# Patient Record
Sex: MALE | Race: OTHER | HISPANIC OR LATINO
[De-identification: names, ages, dates, MRNs, and addresses within clinical notes are randomized per-mention and may not be internally consistent; named-entity substitution may affect disease eponyms.]

---

## 2017-03-07 ENCOUNTER — APPOINTMENT (OUTPATIENT)
Dept: RHEUMATOLOGY | Facility: CLINIC | Age: 57
End: 2017-03-07

## 2017-03-07 VITALS
HEART RATE: 97 BPM | SYSTOLIC BLOOD PRESSURE: 126 MMHG | HEIGHT: 61 IN | WEIGHT: 238 LBS | BODY MASS INDEX: 44.93 KG/M2 | DIASTOLIC BLOOD PRESSURE: 76 MMHG | OXYGEN SATURATION: 97 %

## 2017-03-15 LAB
25(OH)D3 SERPL-MCNC: 31 NG/ML
ALBUMIN SERPL ELPH-MCNC: 4.2 G/DL
ALP BLD-CCNC: 86 U/L
ALT SERPL-CCNC: 36 U/L
ANION GAP SERPL CALC-SCNC: 19 MMOL/L
AST SERPL-CCNC: 35 U/L
BASOPHILS # BLD AUTO: 0.04 K/UL
BASOPHILS NFR BLD AUTO: 0.7 %
BILIRUB SERPL-MCNC: 0.3 MG/DL
BUN SERPL-MCNC: 32 MG/DL
CALCIUM SERPL-MCNC: 10.1 MG/DL
CHLORIDE SERPL-SCNC: 104 MMOL/L
CK SERPL-CCNC: 84 U/L
CO2 SERPL-SCNC: 18 MMOL/L
CREAT SERPL-MCNC: 1.58 MG/DL
CRP SERPL-MCNC: 0.84 MG/DL
EOSINOPHIL # BLD AUTO: 0.14 K/UL
EOSINOPHIL NFR BLD AUTO: 2.6 %
ERYTHROCYTE [SEDIMENTATION RATE] IN BLOOD BY WESTERGREN METHOD: 51 MM/HR
FERRITIN SERPL-MCNC: 932.3 NG/ML
GLUCOSE SERPL-MCNC: 105 MG/DL
HCT VFR BLD CALC: 36.5 %
HGB BLD-MCNC: 12 G/DL
IMM GRANULOCYTES NFR BLD AUTO: 0.6 %
LYMPHOCYTES # BLD AUTO: 2.04 K/UL
LYMPHOCYTES NFR BLD AUTO: 37.4 %
MAN DIFF?: NORMAL
MCHC RBC-ENTMCNC: 31.9 PG
MCHC RBC-ENTMCNC: 32.9 GM/DL
MCV RBC AUTO: 97.1 FL
MONOCYTES # BLD AUTO: 0.47 K/UL
MONOCYTES NFR BLD AUTO: 8.6 %
NEUTROPHILS # BLD AUTO: 2.73 K/UL
NEUTROPHILS NFR BLD AUTO: 50.1 %
PLATELET # BLD AUTO: 275 K/UL
POTASSIUM SERPL-SCNC: 4.4 MMOL/L
PROT SERPL-MCNC: 7.4 G/DL
RBC # BLD: 3.76 M/UL
RBC # FLD: 13 %
SODIUM SERPL-SCNC: 141 MMOL/L
URATE SERPL-MCNC: 6.2 MG/DL
WBC # FLD AUTO: 5.45 K/UL

## 2017-03-29 ENCOUNTER — EMERGENCY (EMERGENCY)
Facility: HOSPITAL | Age: 57
LOS: 1 days | Discharge: HOME | End: 2017-03-29
Attending: EMERGENCY MEDICINE | Admitting: EMERGENCY MEDICINE
Payer: MEDICARE

## 2017-03-29 PROCEDURE — 99283 EMERGENCY DEPT VISIT LOW MDM: CPT

## 2017-03-29 NOTE — ED ADULT TRIAGE NOTE - CHIEF COMPLAINT QUOTE
Pt BIBEMS after being found laying in the middle of the street intoxicated. Pt uncooperative in triage, trying to throw himself from the stretcher. Unable to obtain VS in triage.

## 2017-03-30 VITALS
RESPIRATION RATE: 16 BRPM | SYSTOLIC BLOOD PRESSURE: 138 MMHG | HEART RATE: 88 BPM | TEMPERATURE: 98 F | DIASTOLIC BLOOD PRESSURE: 65 MMHG | OXYGEN SATURATION: 99 %

## 2017-03-30 LAB
ALBUMIN SERPL ELPH-MCNC: 4.2 G/DL — SIGNIFICANT CHANGE UP (ref 3.3–5)
ALP SERPL-CCNC: 96 U/L — SIGNIFICANT CHANGE UP (ref 40–120)
ALT FLD-CCNC: 30 U/L — SIGNIFICANT CHANGE UP (ref 4–41)
AST SERPL-CCNC: 53 U/L — HIGH (ref 4–40)
BASOPHILS # BLD AUTO: 0.04 K/UL — SIGNIFICANT CHANGE UP (ref 0–0.2)
BASOPHILS NFR BLD AUTO: 1 % — SIGNIFICANT CHANGE UP (ref 0–2)
BILIRUB SERPL-MCNC: 0.2 MG/DL — SIGNIFICANT CHANGE UP (ref 0.2–1.2)
BUN SERPL-MCNC: 15 MG/DL — SIGNIFICANT CHANGE UP (ref 7–23)
CALCIUM SERPL-MCNC: 8.8 MG/DL — SIGNIFICANT CHANGE UP (ref 8.4–10.5)
CHLORIDE SERPL-SCNC: 97 MMOL/L — LOW (ref 98–107)
CO2 SERPL-SCNC: 14 MMOL/L — LOW (ref 22–31)
CREAT SERPL-MCNC: 1.68 MG/DL — HIGH (ref 0.5–1.3)
EOSINOPHIL # BLD AUTO: 0.01 K/UL — SIGNIFICANT CHANGE UP (ref 0–0.5)
EOSINOPHIL NFR BLD AUTO: 0.3 % — SIGNIFICANT CHANGE UP (ref 0–6)
GLUCOSE SERPL-MCNC: 161 MG/DL — HIGH (ref 70–99)
HCT VFR BLD CALC: 40.9 % — SIGNIFICANT CHANGE UP (ref 39–50)
HGB BLD-MCNC: 13.9 G/DL — SIGNIFICANT CHANGE UP (ref 13–17)
IMM GRANULOCYTES NFR BLD AUTO: 0.3 % — SIGNIFICANT CHANGE UP (ref 0–1.5)
LYMPHOCYTES # BLD AUTO: 1.63 K/UL — SIGNIFICANT CHANGE UP (ref 1–3.3)
LYMPHOCYTES # BLD AUTO: 41.5 % — SIGNIFICANT CHANGE UP (ref 13–44)
MCHC RBC-ENTMCNC: 32.5 PG — SIGNIFICANT CHANGE UP (ref 27–34)
MCHC RBC-ENTMCNC: 34 % — SIGNIFICANT CHANGE UP (ref 32–36)
MCV RBC AUTO: 95.6 FL — SIGNIFICANT CHANGE UP (ref 80–100)
MONOCYTES # BLD AUTO: 0.15 K/UL — SIGNIFICANT CHANGE UP (ref 0–0.9)
MONOCYTES NFR BLD AUTO: 3.8 % — SIGNIFICANT CHANGE UP (ref 2–14)
NEUTROPHILS # BLD AUTO: 2.09 K/UL — SIGNIFICANT CHANGE UP (ref 1.8–7.4)
NEUTROPHILS NFR BLD AUTO: 53.1 % — SIGNIFICANT CHANGE UP (ref 43–77)
PLATELET # BLD AUTO: 292 K/UL — SIGNIFICANT CHANGE UP (ref 150–400)
PMV BLD: 9.2 FL — SIGNIFICANT CHANGE UP (ref 7–13)
POTASSIUM SERPL-MCNC: 3.7 MMOL/L — SIGNIFICANT CHANGE UP (ref 3.5–5.3)
POTASSIUM SERPL-SCNC: 3.7 MMOL/L — SIGNIFICANT CHANGE UP (ref 3.5–5.3)
PROT SERPL-MCNC: 8.2 G/DL — SIGNIFICANT CHANGE UP (ref 6–8.3)
RBC # BLD: 4.28 M/UL — SIGNIFICANT CHANGE UP (ref 4.2–5.8)
RBC # FLD: 13 % — SIGNIFICANT CHANGE UP (ref 10.3–14.5)
SODIUM SERPL-SCNC: 144 MMOL/L — SIGNIFICANT CHANGE UP (ref 135–145)
WBC # BLD: 3.93 K/UL — SIGNIFICANT CHANGE UP (ref 3.8–10.5)
WBC # FLD AUTO: 3.93 K/UL — SIGNIFICANT CHANGE UP (ref 3.8–10.5)

## 2017-03-30 RX ADMIN — Medication 50 MILLIGRAM(S): at 01:14

## 2017-03-30 NOTE — ED PROVIDER NOTE - OBJECTIVE STATEMENT
55 y/o male with PMH of HTN, HLD, Psoriasis, Gout, h/o sarcoidosis who presents to ED w/ etoh intoxication.  The patient states that he was drinking heavily yesterday evening.  Denies LOC or head trauma. Denies CP or SOB.  Patient states he does not have any hallucinations  Denies SI or HI.   Denies blurry vision or headaches.  Denies any other coingestions 55 y/o male with PMH of HTN, HLD, Psoriasis, Gout, h/o sarcoidosis who presents to ED w/ etoh intoxication.  The patient states that he was drinking heavily yesterday evening.  Denies LOC or head trauma. Denies CP or SOB.  Patient states he does not have any hallucinations  Denies SI or HI.   Denies blurry vision or headaches.  Denies any other coingestions    Hiren att: 56M hx htn hld BIBEMS for laying in middle of street. Patient admits to heavy drinking yesterday, amount and time of last ingestion unknown. Denies loc, trauma, head trauma, blood thinners. Denies fever, cough, cp, sob, abd pain. Patient's speech thick and slurred at this time, will reassess hpi as patient clinical alda.

## 2017-03-30 NOTE — ED PROVIDER NOTE - PHYSICAL EXAMINATION
Hiren att: nad, aaox3, perrl, neg facial traum, neck supple, ctabl,rrrr, s1s2, abd soft ntnd, pelvis stable, neg le edema.

## 2017-03-30 NOTE — ED PROVIDER NOTE - ATTENDING CONTRIBUTION TO CARE
Dr. Maradiaga: I have personally seen and examined this patient at the bedside. I have fully participated in the care of this patient. I have reviewed all pertinent clinical information, including history, physical exam, plan and the Resident's note and agree except as noted. 56M agitated, slurred speech, suspect acute etoh intoxication. PE neg signs of trauma.  Will observe for clinical sobriety

## 2017-06-06 ENCOUNTER — APPOINTMENT (OUTPATIENT)
Dept: RHEUMATOLOGY | Facility: CLINIC | Age: 57
End: 2017-06-06

## 2017-07-25 ENCOUNTER — APPOINTMENT (OUTPATIENT)
Dept: RHEUMATOLOGY | Facility: CLINIC | Age: 57
End: 2017-07-25

## 2017-07-25 VITALS
TEMPERATURE: 98.4 F | HEART RATE: 87 BPM | BODY MASS INDEX: 43.99 KG/M2 | HEIGHT: 61 IN | OXYGEN SATURATION: 98 % | DIASTOLIC BLOOD PRESSURE: 76 MMHG | SYSTOLIC BLOOD PRESSURE: 136 MMHG | WEIGHT: 233 LBS

## 2017-07-31 LAB
25(OH)D3 SERPL-MCNC: 20.9 NG/ML
ALBUMIN SERPL ELPH-MCNC: 4.5 G/DL
ALP BLD-CCNC: 94 U/L
ALT SERPL-CCNC: 29 U/L
ANION GAP SERPL CALC-SCNC: 16 MMOL/L
AST SERPL-CCNC: 31 U/L
BASOPHILS # BLD AUTO: 0.03 K/UL
BASOPHILS NFR BLD AUTO: 0.8 %
BILIRUB SERPL-MCNC: 0.2 MG/DL
BUN SERPL-MCNC: 18 MG/DL
CALCIUM SERPL-MCNC: 9.5 MG/DL
CHLORIDE SERPL-SCNC: 105 MMOL/L
CK SERPL-CCNC: 78 U/L
CO2 SERPL-SCNC: 20 MMOL/L
CREAT SERPL-MCNC: 1.29 MG/DL
CRP SERPL-MCNC: 0.8 MG/DL
EOSINOPHIL # BLD AUTO: 0.09 K/UL
EOSINOPHIL NFR BLD AUTO: 2.3 %
ERYTHROCYTE [SEDIMENTATION RATE] IN BLOOD BY WESTERGREN METHOD: 46 MM/HR
FERRITIN SERPL-MCNC: 1114 NG/ML
GLUCOSE SERPL-MCNC: 107 MG/DL
HCT VFR BLD CALC: 37.5 %
HGB BLD-MCNC: 12.2 G/DL
IMM GRANULOCYTES NFR BLD AUTO: 1 %
LYMPHOCYTES # BLD AUTO: 1.64 K/UL
LYMPHOCYTES NFR BLD AUTO: 42.2 %
MAN DIFF?: NORMAL
MCHC RBC-ENTMCNC: 31.3 PG
MCHC RBC-ENTMCNC: 32.5 GM/DL
MCV RBC AUTO: 96.2 FL
MONOCYTES # BLD AUTO: 0.44 K/UL
MONOCYTES NFR BLD AUTO: 11.3 %
NEUTROPHILS # BLD AUTO: 1.65 K/UL
NEUTROPHILS NFR BLD AUTO: 42.4 %
PLATELET # BLD AUTO: 319 K/UL
POTASSIUM SERPL-SCNC: 4.8 MMOL/L
PROT SERPL-MCNC: 7.5 G/DL
RBC # BLD: 3.9 M/UL
RBC # FLD: 14.2 %
SODIUM SERPL-SCNC: 141 MMOL/L
URATE SERPL-MCNC: 5.3 MG/DL
WBC # FLD AUTO: 3.89 K/UL

## 2017-08-03 ENCOUNTER — FORM ENCOUNTER (OUTPATIENT)
Age: 57
End: 2017-08-03

## 2017-08-04 ENCOUNTER — APPOINTMENT (OUTPATIENT)
Dept: MRI IMAGING | Facility: IMAGING CENTER | Age: 57
End: 2017-08-04
Payer: MEDICARE

## 2017-08-04 ENCOUNTER — APPOINTMENT (OUTPATIENT)
Dept: MRI IMAGING | Facility: IMAGING CENTER | Age: 57
End: 2017-08-04

## 2017-08-04 ENCOUNTER — OUTPATIENT (OUTPATIENT)
Dept: OUTPATIENT SERVICES | Facility: HOSPITAL | Age: 57
LOS: 1 days | End: 2017-08-04
Payer: MEDICARE

## 2017-08-04 DIAGNOSIS — R42 DIZZINESS AND GIDDINESS: ICD-10-CM

## 2017-08-04 PROCEDURE — 70551 MRI BRAIN STEM W/O DYE: CPT

## 2017-08-04 PROCEDURE — 70551 MRI BRAIN STEM W/O DYE: CPT | Mod: 26

## 2017-08-04 PROCEDURE — 70544 MR ANGIOGRAPHY HEAD W/O DYE: CPT

## 2017-08-25 ENCOUNTER — APPOINTMENT (OUTPATIENT)
Dept: NEUROLOGY | Facility: CLINIC | Age: 57
End: 2017-08-25

## 2017-09-12 ENCOUNTER — MEDICATION RENEWAL (OUTPATIENT)
Age: 57
End: 2017-09-12

## 2017-09-13 ENCOUNTER — APPOINTMENT (OUTPATIENT)
Dept: NEUROLOGY | Facility: CLINIC | Age: 57
End: 2017-09-13
Payer: MEDICARE

## 2017-09-13 VITALS
HEIGHT: 61 IN | WEIGHT: 225 LBS | HEART RATE: 95 BPM | OXYGEN SATURATION: 96 % | DIASTOLIC BLOOD PRESSURE: 78 MMHG | BODY MASS INDEX: 42.48 KG/M2 | SYSTOLIC BLOOD PRESSURE: 128 MMHG

## 2017-09-13 DIAGNOSIS — R42 DIZZINESS AND GIDDINESS: ICD-10-CM

## 2017-09-13 PROCEDURE — 99213 OFFICE O/P EST LOW 20 MIN: CPT

## 2017-12-05 ENCOUNTER — APPOINTMENT (OUTPATIENT)
Dept: RHEUMATOLOGY | Facility: CLINIC | Age: 57
End: 2017-12-05
Payer: MEDICARE

## 2017-12-05 VITALS
BODY MASS INDEX: 42.48 KG/M2 | OXYGEN SATURATION: 97 % | SYSTOLIC BLOOD PRESSURE: 116 MMHG | DIASTOLIC BLOOD PRESSURE: 76 MMHG | HEIGHT: 61 IN | HEART RATE: 96 BPM | TEMPERATURE: 98.2 F | WEIGHT: 225 LBS | RESPIRATION RATE: 16 BRPM

## 2017-12-05 PROCEDURE — 99214 OFFICE O/P EST MOD 30 MIN: CPT

## 2017-12-15 ENCOUNTER — APPOINTMENT (OUTPATIENT)
Dept: NEUROLOGY | Facility: CLINIC | Age: 57
End: 2017-12-15

## 2017-12-29 ENCOUNTER — APPOINTMENT (OUTPATIENT)
Dept: NEUROLOGY | Facility: CLINIC | Age: 57
End: 2017-12-29
Payer: MEDICARE

## 2017-12-29 VITALS
DIASTOLIC BLOOD PRESSURE: 79 MMHG | HEART RATE: 77 BPM | SYSTOLIC BLOOD PRESSURE: 125 MMHG | BODY MASS INDEX: 32.2 KG/M2 | WEIGHT: 230 LBS | HEIGHT: 71 IN

## 2017-12-29 DIAGNOSIS — M51.16 INTERVERTEBRAL DISC DISORDERS WITH RADICULOPATHY, LUMBAR REGION: ICD-10-CM

## 2017-12-29 PROCEDURE — 99214 OFFICE O/P EST MOD 30 MIN: CPT

## 2017-12-29 RX ORDER — MULTIVIT-MIN/IRON/FOLIC ACID/K 18-600-40
CAPSULE ORAL
Refills: 0 | Status: ACTIVE | COMMUNITY

## 2017-12-29 RX ORDER — CRANBERRY FRUIT EXTRACT 200 MG
CAPSULE ORAL
Refills: 0 | Status: ACTIVE | COMMUNITY

## 2017-12-29 RX ORDER — VITAMIN E ACID SUCCINATE 268 MG
TABLET ORAL
Refills: 0 | Status: ACTIVE | COMMUNITY

## 2017-12-29 RX ORDER — ZINC SULFATE 50(220)MG
CAPSULE ORAL
Refills: 0 | Status: ACTIVE | COMMUNITY

## 2018-02-14 ENCOUNTER — APPOINTMENT (OUTPATIENT)
Dept: RHEUMATOLOGY | Facility: CLINIC | Age: 58
End: 2018-02-14
Payer: MEDICARE

## 2018-02-14 VITALS
BODY MASS INDEX: 30.1 KG/M2 | HEIGHT: 71 IN | SYSTOLIC BLOOD PRESSURE: 131 MMHG | DIASTOLIC BLOOD PRESSURE: 78 MMHG | TEMPERATURE: 98.6 F | HEART RATE: 93 BPM | WEIGHT: 215 LBS | OXYGEN SATURATION: 98 %

## 2018-02-14 PROCEDURE — 99213 OFFICE O/P EST LOW 20 MIN: CPT

## 2018-02-14 RX ORDER — AMLODIPINE BESYLATE 5 MG/1
5 TABLET ORAL
Qty: 90 | Refills: 0 | Status: DISCONTINUED | COMMUNITY
Start: 2017-12-12 | End: 2018-02-14

## 2018-02-14 RX ORDER — CYCLOBENZAPRINE HYDROCHLORIDE 5 MG/1
5 TABLET, FILM COATED ORAL
Qty: 10 | Refills: 0 | Status: DISCONTINUED | COMMUNITY
Start: 2017-12-05 | End: 2018-02-14

## 2018-03-07 LAB
25(OH)D3 SERPL-MCNC: 17.2 NG/ML
ALBUMIN SERPL ELPH-MCNC: 4.2 G/DL
ALP BLD-CCNC: 88 U/L
ALT SERPL-CCNC: 47 U/L
ANION GAP SERPL CALC-SCNC: 14 MMOL/L
AST SERPL-CCNC: 58 U/L
BASOPHILS # BLD AUTO: 0.04 K/UL
BASOPHILS NFR BLD AUTO: 0.8 %
BILIRUB SERPL-MCNC: 0.6 MG/DL
BUN SERPL-MCNC: 27 MG/DL
CALCIUM SERPL-MCNC: 9.5 MG/DL
CHLORIDE SERPL-SCNC: 100 MMOL/L
CK SERPL-CCNC: 68 U/L
CO2 SERPL-SCNC: 25 MMOL/L
CREAT SERPL-MCNC: 1.44 MG/DL
CRP SERPL-MCNC: 1 MG/DL
EOSINOPHIL # BLD AUTO: 0.08 K/UL
EOSINOPHIL NFR BLD AUTO: 1.6 %
ERYTHROCYTE [SEDIMENTATION RATE] IN BLOOD BY WESTERGREN METHOD: 49 MM/HR
FERRITIN SERPL-MCNC: 1592 NG/ML
GLUCOSE SERPL-MCNC: 107 MG/DL
HCT VFR BLD CALC: 35.5 %
HGB BLD-MCNC: 11.7 G/DL
IMM GRANULOCYTES NFR BLD AUTO: 0.4 %
LYMPHOCYTES # BLD AUTO: 1.37 K/UL
LYMPHOCYTES NFR BLD AUTO: 28.1 %
MAN DIFF?: NORMAL
MCHC RBC-ENTMCNC: 32.3 PG
MCHC RBC-ENTMCNC: 33 GM/DL
MCV RBC AUTO: 98.1 FL
MONOCYTES # BLD AUTO: 0.4 K/UL
MONOCYTES NFR BLD AUTO: 8.2 %
NEUTROPHILS # BLD AUTO: 2.97 K/UL
NEUTROPHILS NFR BLD AUTO: 60.9 %
PLATELET # BLD AUTO: 147 K/UL
POTASSIUM SERPL-SCNC: 4.2 MMOL/L
PROT SERPL-MCNC: 7.6 G/DL
RBC # BLD: 3.62 M/UL
RBC # FLD: 13.7 %
SODIUM SERPL-SCNC: 139 MMOL/L
URATE SERPL-MCNC: 7.2 MG/DL
WBC # FLD AUTO: 4.88 K/UL

## 2018-05-14 ENCOUNTER — APPOINTMENT (OUTPATIENT)
Dept: RHEUMATOLOGY | Facility: CLINIC | Age: 58
End: 2018-05-14

## 2018-06-29 ENCOUNTER — APPOINTMENT (OUTPATIENT)
Dept: NEUROLOGY | Facility: CLINIC | Age: 58
End: 2018-06-29

## 2018-09-21 ENCOUNTER — APPOINTMENT (OUTPATIENT)
Dept: NEUROLOGY | Facility: CLINIC | Age: 58
End: 2018-09-21

## 2018-11-23 ENCOUNTER — APPOINTMENT (OUTPATIENT)
Dept: NEUROLOGY | Facility: CLINIC | Age: 58
End: 2018-11-23

## 2018-12-19 ENCOUNTER — APPOINTMENT (OUTPATIENT)
Dept: NEUROLOGY | Facility: CLINIC | Age: 58
End: 2018-12-19
Payer: MEDICARE

## 2018-12-19 VITALS
OXYGEN SATURATION: 98 % | BODY MASS INDEX: 29.68 KG/M2 | HEART RATE: 74 BPM | SYSTOLIC BLOOD PRESSURE: 130 MMHG | RESPIRATION RATE: 17 BRPM | WEIGHT: 212 LBS | DIASTOLIC BLOOD PRESSURE: 80 MMHG | HEIGHT: 71 IN | TEMPERATURE: 98.3 F

## 2018-12-19 DIAGNOSIS — H81.10 BENIGN PAROXYSMAL VERTIGO, UNSPECIFIED EAR: ICD-10-CM

## 2018-12-19 PROCEDURE — 99213 OFFICE O/P EST LOW 20 MIN: CPT

## 2019-01-29 ENCOUNTER — LABORATORY RESULT (OUTPATIENT)
Age: 59
End: 2019-01-29

## 2019-01-29 ENCOUNTER — APPOINTMENT (OUTPATIENT)
Dept: RHEUMATOLOGY | Facility: CLINIC | Age: 59
End: 2019-01-29
Payer: MEDICARE

## 2019-01-29 VITALS
HEART RATE: 79 BPM | SYSTOLIC BLOOD PRESSURE: 151 MMHG | WEIGHT: 229 LBS | RESPIRATION RATE: 16 BRPM | BODY MASS INDEX: 32.06 KG/M2 | TEMPERATURE: 97.6 F | HEIGHT: 71 IN | DIASTOLIC BLOOD PRESSURE: 91 MMHG | OXYGEN SATURATION: 98 %

## 2019-01-29 PROCEDURE — 99214 OFFICE O/P EST MOD 30 MIN: CPT

## 2019-01-29 RX ORDER — MECLIZINE HYDROCHLORIDE 12.5 MG/1
12.5 TABLET ORAL 3 TIMES DAILY
Qty: 21 | Refills: 0 | Status: DISCONTINUED | COMMUNITY
Start: 2017-12-05 | End: 2019-01-29

## 2019-02-01 LAB
25(OH)D3 SERPL-MCNC: 27.2 NG/ML
ACE BLD-CCNC: 48 U/L
ALBUMIN SERPL ELPH-MCNC: 4.1 G/DL
ALP BLD-CCNC: 72 U/L
ALT SERPL-CCNC: 10 U/L
ANION GAP SERPL CALC-SCNC: 12 MMOL/L
APPEARANCE: CLEAR
AST SERPL-CCNC: 18 U/L
BASOPHILS # BLD AUTO: 0.04 K/UL
BASOPHILS NFR BLD AUTO: 0.8 %
BILIRUB SERPL-MCNC: 0.4 MG/DL
BILIRUBIN URINE: NEGATIVE
BLOOD URINE: NEGATIVE
BUN SERPL-MCNC: 23 MG/DL
CALCIUM SERPL-MCNC: 10.2 MG/DL
CHLORIDE SERPL-SCNC: 104 MMOL/L
CK SERPL-CCNC: 69 U/L
CO2 SERPL-SCNC: 25 MMOL/L
COLOR: YELLOW
CREAT SERPL-MCNC: 1.23 MG/DL
CREAT SPEC-SCNC: 222 MG/DL
CREAT/PROT UR: 0.3 RATIO
CRP SERPL-MCNC: 0.73 MG/DL
EOSINOPHIL # BLD AUTO: 0.26 K/UL
EOSINOPHIL NFR BLD AUTO: 5.2 %
FERRITIN SERPL-MCNC: 412 NG/ML
GLUCOSE QUALITATIVE U: NEGATIVE MG/DL
GLUCOSE SERPL-MCNC: 87 MG/DL
HCT VFR BLD CALC: 38.8 %
HGB BLD-MCNC: 12.4 G/DL
IMM GRANULOCYTES NFR BLD AUTO: 0.4 %
KETONES URINE: ABNORMAL
LEUKOCYTE ESTERASE URINE: NEGATIVE
LYMPHOCYTES # BLD AUTO: 1.87 K/UL
LYMPHOCYTES NFR BLD AUTO: 37.2 %
MAN DIFF?: NORMAL
MCHC RBC-ENTMCNC: 31.2 PG
MCHC RBC-ENTMCNC: 32 GM/DL
MCV RBC AUTO: 97.5 FL
MONOCYTES # BLD AUTO: 0.44 K/UL
MONOCYTES NFR BLD AUTO: 8.7 %
NEUTROPHILS # BLD AUTO: 2.4 K/UL
NEUTROPHILS NFR BLD AUTO: 47.7 %
NITRITE URINE: NEGATIVE
PH URINE: 5.5
PLATELET # BLD AUTO: 341 K/UL
POTASSIUM SERPL-SCNC: 4.2 MMOL/L
PROT SERPL-MCNC: 7.9 G/DL
PROT UR-MCNC: 57 MG/DL
PROTEIN URINE: 30 MG/DL
RBC # BLD: 3.98 M/UL
RBC # FLD: 12.7 %
SODIUM SERPL-SCNC: 141 MMOL/L
SPECIFIC GRAVITY URINE: 1.02
URATE SERPL-MCNC: 8.1 MG/DL
UROBILINOGEN URINE: NEGATIVE MG/DL
WBC # FLD AUTO: 5.03 K/UL

## 2019-04-08 ENCOUNTER — RX RENEWAL (OUTPATIENT)
Age: 59
End: 2019-04-08

## 2019-04-23 ENCOUNTER — APPOINTMENT (OUTPATIENT)
Dept: RHEUMATOLOGY | Facility: CLINIC | Age: 59
End: 2019-04-23

## 2019-05-15 ENCOUNTER — RX RENEWAL (OUTPATIENT)
Age: 59
End: 2019-05-15

## 2019-05-24 ENCOUNTER — MEDICATION RENEWAL (OUTPATIENT)
Age: 59
End: 2019-05-24

## 2019-06-19 ENCOUNTER — APPOINTMENT (OUTPATIENT)
Dept: NEUROLOGY | Facility: CLINIC | Age: 59
End: 2019-06-19

## 2019-10-14 ENCOUNTER — RX RENEWAL (OUTPATIENT)
Age: 59
End: 2019-10-14

## 2019-10-14 ENCOUNTER — MEDICATION RENEWAL (OUTPATIENT)
Age: 59
End: 2019-10-14

## 2019-10-16 ENCOUNTER — APPOINTMENT (OUTPATIENT)
Dept: NEUROLOGY | Facility: CLINIC | Age: 59
End: 2019-10-16

## 2019-11-05 ENCOUNTER — LABORATORY RESULT (OUTPATIENT)
Age: 59
End: 2019-11-05

## 2019-11-05 ENCOUNTER — APPOINTMENT (OUTPATIENT)
Dept: RHEUMATOLOGY | Facility: CLINIC | Age: 59
End: 2019-11-05
Payer: MEDICARE

## 2019-11-05 VITALS
TEMPERATURE: 97.9 F | WEIGHT: 223 LBS | HEIGHT: 71 IN | SYSTOLIC BLOOD PRESSURE: 143 MMHG | DIASTOLIC BLOOD PRESSURE: 88 MMHG | OXYGEN SATURATION: 98 % | HEART RATE: 80 BPM | BODY MASS INDEX: 31.22 KG/M2 | RESPIRATION RATE: 16 BRPM

## 2019-11-05 PROCEDURE — 90674 CCIIV4 VAC NO PRSV 0.5 ML IM: CPT

## 2019-11-05 PROCEDURE — 99214 OFFICE O/P EST MOD 30 MIN: CPT | Mod: 25

## 2019-11-05 PROCEDURE — G0008: CPT

## 2019-11-07 ENCOUNTER — MED ADMIN CHARGE (OUTPATIENT)
Age: 59
End: 2019-11-07

## 2019-11-08 LAB
25(OH)D3 SERPL-MCNC: 15.3 NG/ML
ACE BLD-CCNC: 32 U/L
ALBUMIN SERPL ELPH-MCNC: 3.8 G/DL
ALP BLD-CCNC: 88 U/L
ALT SERPL-CCNC: 8 U/L
ANA SER IF-ACNC: NEGATIVE
ANION GAP SERPL CALC-SCNC: 14 MMOL/L
APPEARANCE: CLEAR
AST SERPL-CCNC: 14 U/L
BASOPHILS # BLD AUTO: 0.05 K/UL
BASOPHILS NFR BLD AUTO: 0.8 %
BILIRUB SERPL-MCNC: 0.3 MG/DL
BILIRUBIN URINE: ABNORMAL
BLOOD URINE: NEGATIVE
BUN SERPL-MCNC: 23 MG/DL
CALCIUM SERPL-MCNC: 9.5 MG/DL
CCP AB SER IA-ACNC: <8 UNITS
CHLORIDE SERPL-SCNC: 102 MMOL/L
CK SERPL-CCNC: 66 U/L
CO2 SERPL-SCNC: 20 MMOL/L
COLOR: YELLOW
CREAT SERPL-MCNC: 1.38 MG/DL
CREAT SPEC-SCNC: 301 MG/DL
CREAT/PROT UR: 0.2 RATIO
CRP SERPL-MCNC: 1.12 MG/DL
DEPRECATED KAPPA LC FREE/LAMBDA SER: 1.55 RATIO
DSDNA AB SER-ACNC: <12 IU/ML
ENA RNP AB SER IA-ACNC: <0.2 AL
ENA SM AB SER IA-ACNC: <0.2 AL
ENA SS-A AB SER IA-ACNC: <0.2 AL
ENA SS-B AB SER IA-ACNC: <0.2 AL
EOSINOPHIL # BLD AUTO: 0.25 K/UL
EOSINOPHIL NFR BLD AUTO: 3.9 %
ERYTHROCYTE [SEDIMENTATION RATE] IN BLOOD BY WESTERGREN METHOD: 75 MM/HR
FERRITIN SERPL-MCNC: 618 NG/ML
GLUCOSE QUALITATIVE U: NEGATIVE
GLUCOSE SERPL-MCNC: 101 MG/DL
HCT VFR BLD CALC: 33.3 %
HGB BLD-MCNC: 10.1 G/DL
IGA SER QL IEP: 415 MG/DL
IGG SER QL IEP: 1785 MG/DL
IGM SER QL IEP: 183 MG/DL
IMM GRANULOCYTES NFR BLD AUTO: 0.6 %
KAPPA LC CSF-MCNC: 4.47 MG/DL
KAPPA LC SERPL-MCNC: 6.92 MG/DL
KETONES URINE: NORMAL
LEUKOCYTE ESTERASE URINE: NEGATIVE
LYMPHOCYTES # BLD AUTO: 2.18 K/UL
LYMPHOCYTES NFR BLD AUTO: 34.2 %
MAN DIFF?: NORMAL
MCHC RBC-ENTMCNC: 29.7 PG
MCHC RBC-ENTMCNC: 30.3 GM/DL
MCV RBC AUTO: 97.9 FL
MONOCYTES # BLD AUTO: 0.51 K/UL
MONOCYTES NFR BLD AUTO: 8 %
NEUTROPHILS # BLD AUTO: 3.34 K/UL
NEUTROPHILS NFR BLD AUTO: 52.5 %
NITRITE URINE: NEGATIVE
PH URINE: 5.5
PLATELET # BLD AUTO: 339 K/UL
POTASSIUM SERPL-SCNC: 4 MMOL/L
PROT SERPL-MCNC: 7.5 G/DL
PROT UR-MCNC: 64 MG/DL
PROTEIN URINE: ABNORMAL
RBC # BLD: 3.4 M/UL
RBC # FLD: 15 %
RF+CCP IGG SER-IMP: NEGATIVE
RHEUMATOID FACT SER QL: <10 IU/ML
SODIUM SERPL-SCNC: 136 MMOL/L
SPECIFIC GRAVITY URINE: 1.03
URATE SERPL-MCNC: 5.8 MG/DL
UROBILINOGEN URINE: NORMAL
WBC # FLD AUTO: 6.37 K/UL

## 2019-11-12 ENCOUNTER — CHART COPY (OUTPATIENT)
Age: 59
End: 2019-11-12

## 2019-11-14 ENCOUNTER — MEDICATION RENEWAL (OUTPATIENT)
Age: 59
End: 2019-11-14

## 2019-11-15 ENCOUNTER — CHART COPY (OUTPATIENT)
Age: 59
End: 2019-11-15

## 2019-11-18 ENCOUNTER — MEDICATION RENEWAL (OUTPATIENT)
Age: 59
End: 2019-11-18

## 2019-12-17 ENCOUNTER — APPOINTMENT (OUTPATIENT)
Dept: RHEUMATOLOGY | Facility: CLINIC | Age: 59
End: 2019-12-17
Payer: MEDICARE

## 2019-12-17 VITALS
HEIGHT: 71 IN | TEMPERATURE: 97.8 F | SYSTOLIC BLOOD PRESSURE: 141 MMHG | BODY MASS INDEX: 30.1 KG/M2 | HEART RATE: 86 BPM | WEIGHT: 215 LBS | DIASTOLIC BLOOD PRESSURE: 78 MMHG | OXYGEN SATURATION: 98 % | RESPIRATION RATE: 16 BRPM

## 2019-12-17 PROCEDURE — 99214 OFFICE O/P EST MOD 30 MIN: CPT

## 2020-03-03 ENCOUNTER — APPOINTMENT (OUTPATIENT)
Dept: RHEUMATOLOGY | Facility: CLINIC | Age: 60
End: 2020-03-03

## 2020-05-07 ENCOUNTER — EMERGENCY (EMERGENCY)
Facility: HOSPITAL | Age: 60
LOS: 1 days | Discharge: HOME | End: 2020-05-07
Attending: EMERGENCY MEDICINE
Payer: MEDICARE

## 2020-05-07 VITALS
TEMPERATURE: 98 F | RESPIRATION RATE: 16 BRPM | SYSTOLIC BLOOD PRESSURE: 117 MMHG | HEART RATE: 139 BPM | DIASTOLIC BLOOD PRESSURE: 74 MMHG

## 2020-05-07 LAB
ALBUMIN SERPL ELPH-MCNC: 4.2 G/DL — SIGNIFICANT CHANGE UP (ref 3.3–5)
ALP SERPL-CCNC: 112 U/L — SIGNIFICANT CHANGE UP (ref 40–120)
ALT FLD-CCNC: 20 U/L — SIGNIFICANT CHANGE UP (ref 4–41)
ANION GAP SERPL CALC-SCNC: 29 MMO/L — HIGH (ref 7–14)
AST SERPL-CCNC: 46 U/L — HIGH (ref 4–40)
BASOPHILS # BLD AUTO: 0.06 K/UL — SIGNIFICANT CHANGE UP (ref 0–0.2)
BASOPHILS NFR BLD AUTO: 0.8 % — SIGNIFICANT CHANGE UP (ref 0–2)
BILIRUB SERPL-MCNC: 0.4 MG/DL — SIGNIFICANT CHANGE UP (ref 0.2–1.2)
BUN SERPL-MCNC: 17 MG/DL — SIGNIFICANT CHANGE UP (ref 7–23)
CALCIUM SERPL-MCNC: 9.2 MG/DL — SIGNIFICANT CHANGE UP (ref 8.4–10.5)
CHLORIDE SERPL-SCNC: 97 MMOL/L — LOW (ref 98–107)
CO2 SERPL-SCNC: 16 MMOL/L — LOW (ref 22–31)
CREAT SERPL-MCNC: 1.58 MG/DL — HIGH (ref 0.5–1.3)
EOSINOPHIL # BLD AUTO: 0.01 K/UL — SIGNIFICANT CHANGE UP (ref 0–0.5)
EOSINOPHIL NFR BLD AUTO: 0.1 % — SIGNIFICANT CHANGE UP (ref 0–6)
ETHANOL BLD-MCNC: 323 MG/DL — HIGH
GLUCOSE SERPL-MCNC: 119 MG/DL — HIGH (ref 70–99)
HCT VFR BLD CALC: 38.4 % — LOW (ref 39–50)
HGB BLD-MCNC: 12.8 G/DL — LOW (ref 13–17)
IMM GRANULOCYTES NFR BLD AUTO: 0.3 % — SIGNIFICANT CHANGE UP (ref 0–1.5)
LYMPHOCYTES # BLD AUTO: 1.19 K/UL — SIGNIFICANT CHANGE UP (ref 1–3.3)
LYMPHOCYTES # BLD AUTO: 15.5 % — SIGNIFICANT CHANGE UP (ref 13–44)
MCHC RBC-ENTMCNC: 32.2 PG — SIGNIFICANT CHANGE UP (ref 27–34)
MCHC RBC-ENTMCNC: 33.3 % — SIGNIFICANT CHANGE UP (ref 32–36)
MCV RBC AUTO: 96.5 FL — SIGNIFICANT CHANGE UP (ref 80–100)
MONOCYTES # BLD AUTO: 0.24 K/UL — SIGNIFICANT CHANGE UP (ref 0–0.9)
MONOCYTES NFR BLD AUTO: 3.1 % — SIGNIFICANT CHANGE UP (ref 2–14)
NEUTROPHILS # BLD AUTO: 6.18 K/UL — SIGNIFICANT CHANGE UP (ref 1.8–7.4)
NEUTROPHILS NFR BLD AUTO: 80.2 % — HIGH (ref 43–77)
NRBC # FLD: 0 K/UL — SIGNIFICANT CHANGE UP (ref 0–0)
PLATELET # BLD AUTO: 389 K/UL — SIGNIFICANT CHANGE UP (ref 150–400)
PMV BLD: 8.9 FL — SIGNIFICANT CHANGE UP (ref 7–13)
POTASSIUM SERPL-MCNC: 4.1 MMOL/L — SIGNIFICANT CHANGE UP (ref 3.5–5.3)
POTASSIUM SERPL-SCNC: 4.1 MMOL/L — SIGNIFICANT CHANGE UP (ref 3.5–5.3)
PROT SERPL-MCNC: 8.4 G/DL — HIGH (ref 6–8.3)
RBC # BLD: 3.98 M/UL — LOW (ref 4.2–5.8)
RBC # FLD: 13.9 % — SIGNIFICANT CHANGE UP (ref 10.3–14.5)
SODIUM SERPL-SCNC: 142 MMOL/L — SIGNIFICANT CHANGE UP (ref 135–145)
TSH SERPL-MCNC: 5.67 UIU/ML — HIGH (ref 0.27–4.2)
WBC # BLD: 7.7 K/UL — SIGNIFICANT CHANGE UP (ref 3.8–10.5)
WBC # FLD AUTO: 7.7 K/UL — SIGNIFICANT CHANGE UP (ref 3.8–10.5)

## 2020-05-07 PROCEDURE — 99285 EMERGENCY DEPT VISIT HI MDM: CPT | Mod: 25

## 2020-05-07 PROCEDURE — 70450 CT HEAD/BRAIN W/O DYE: CPT | Mod: 26

## 2020-05-07 PROCEDURE — 93010 ELECTROCARDIOGRAM REPORT: CPT

## 2020-05-07 RX ORDER — SODIUM CHLORIDE 9 MG/ML
1000 INJECTION INTRAMUSCULAR; INTRAVENOUS; SUBCUTANEOUS ONCE
Refills: 0 | Status: COMPLETED | OUTPATIENT
Start: 2020-05-07 | End: 2020-05-07

## 2020-05-07 RX ADMIN — SODIUM CHLORIDE 1000 MILLILITER(S): 9 INJECTION INTRAMUSCULAR; INTRAVENOUS; SUBCUTANEOUS at 20:12

## 2020-05-07 RX ADMIN — SODIUM CHLORIDE 1000 MILLILITER(S): 9 INJECTION INTRAMUSCULAR; INTRAVENOUS; SUBCUTANEOUS at 23:00

## 2020-05-07 RX ADMIN — SODIUM CHLORIDE 1000 MILLILITER(S): 9 INJECTION INTRAMUSCULAR; INTRAVENOUS; SUBCUTANEOUS at 22:00

## 2020-05-07 RX ADMIN — SODIUM CHLORIDE 1000 MILLILITER(S): 9 INJECTION INTRAMUSCULAR; INTRAVENOUS; SUBCUTANEOUS at 19:12

## 2020-05-07 RX ADMIN — Medication 1 MILLIGRAM(S): at 23:22

## 2020-05-07 NOTE — ED PROVIDER NOTE - CARE PLAN
Principal Discharge DX:	Alcohol abuse Principal Discharge DX:	Alcohol abuse  Secondary Diagnosis:	Depression

## 2020-05-07 NOTE — ED ADULT NURSE NOTE - OBJECTIVE STATEMENT
pt received in room 20, AOx4, ambulatory at baseline. Pt c/o of drinking this morning and falling and hitting his head around 1pm. Pt states he drinks everyday due to depression. Pt denies homicidal, and suicidal. Pt states he use to take medication for his depression. Pt denies loss of consciousness. Pt denies chest pain, SOB, fever, chills. Upon assessment, no abrasions noted on pt head, a small scratch noted on left knee. Pt breathing equal and unlabored, on room air. Pt on cardiac monitor, sinus tachy.  labs sent, 20G IV placed in right AC. safety measures in please. will continue to monitor.

## 2020-05-07 NOTE — ED PROVIDER NOTE - ATTENDING CONTRIBUTION TO CARE
I performed a history and physical exam of the patient and discussed their management with the resident and /or advanced care provider. I reviewed the resident and /or ACP's note and agree with the documented findings and plan of care. My medical decison making and observations are found above.    lavern: 60 yo man presents for alcohol intoxication and trip and fall, denies hitting his head, has small abrasion on right knee, otherwise no sign of trauma.  pt tachycardic, 130's now, 120's on ecg.  hydrated x 1 liter, will continue hydration.  pt awake and alert and can speak, but etoh level still very high and continues with tachycardia.  will send tsh and eval for withdrawal as we observe

## 2020-05-07 NOTE — ED PROVIDER NOTE - OBJECTIVE STATEMENT
58yo M with PMHx of HTN, depression, etoh use presents for depression. Pt reports he has been feeling down the last 2 years after losing his job. He stopped outpatient therapy because he did not like his therapist. Denies SI/HI. Pt reports etoh use today. While walking near his house, he fell and bystanders called EMS. Pt reports hitting his head but denies LOC. Pt denies pain, changes in vision - double vision, blurry vision or ha. Pt denies history of alcohol withdrawal and tremors.

## 2020-05-07 NOTE — ED PROVIDER NOTE - PATIENT PORTAL LINK FT
You can access the FollowMyHealth Patient Portal offered by [facility] by registering at the following website: [email or web address]. By joining [employer]’s FollowMyHealth portal, you will also be able to view your health information using other applications (apps) compatible with our system.

## 2020-05-07 NOTE — ED PROVIDER NOTE - NSFOLLOWUPINSTRUCTIONS_ED_ALL_ED_FT
1. You were seen in the Emergency Room for feelings of depression as well as alcohol use  2. You should continue to take all medications as prescribed. You should continue to take your amlodipine as uncontrolled high blood pressure could lead to kidney, eye, heart disease  3. Please follow up with your cardiologist doctor in 24-48 hours.  4. Return to the emergency room or seek immediate assistance for any new or concerning symptoms (such as fevers, chills,  chest pain, difficulty breathing, feeling like your heart is beating out of your chest/beating hard or fast, feelings as though you want to hurt yourself or others, hearing voices or seeing things that other people might not hear or see ), or if you get worse.   5. Copies of your tests were provided to you for follow-up.  You must address all your findings with your doctor.     You should call a psychiatrist or a psychologist from the list provided to you and make an appointment to be seen. You should return to the Emergency Room IMMEDIATELY for feelings as though you want to hurt yourself or others, hearing voices or seeing things that other people might not hear or see.    You should also call the number provided to you for alcohol cessation (to help you stop drinking). You should do this under the guidance of a professional because stopping "cold-turkey" or all of a sudden could be dangerous, leading to tremors, seizures or death

## 2020-05-07 NOTE — ED PROVIDER NOTE - PROGRESS NOTE DETAILS
Jodi Borja MD PGY-2 pt signed out to me pending fluids for tachycardia, labs and CT head and EKG. Patient currently denies SI/HI, auditory or visual hallucinations. Will assess for resolution of tachycardia after IVF and given no SI/HI, auditory or visual hallucinations, patient can likely be d/c'd with outpatient psychiatry/psychology follow up Jodi Borja MD PGY-2 persistently tachycardic 128. based on ethanol 353 at 1909, patient should not yet be discharged. ambulating appropriately. Will give additional IVF and if it does not improve HR, will consider ativan Jodi Borja MD PGY-2 updated sister vanessa jimenez [phone] with patients permission in regards to patients situation and CT scan results. she will arrange to have him picked up when he is ready for discharge but she is concerned because she states rosalee gets depressed when he drinks, so wants to ensure he is completely sober prior to dc. based on JAMISON, will be sober by 5AM(10 hours after JAMISON drawn in ER). pt is still persistently tachy after 2nd L IVF so will trial 1 mg IV ativan. has mild R hand tremor, no tongue fasiculations,  Jodi Borja MD PGY-2 pt clinically sober. ambulatory. HR 120s but patient states his HR is always elevated, follows with cardiologist Dr. Morrell. Has not taken his amlodipine in 2 days, although he has enough medication. No concern for infectious or dangerous causes of tachycardia at this time. Calm and cooperative. No tongue fasiculations. Spoke with sister, who will pick patient up approx 3 AM. Patient denies SI/HI, auditory or visual hallucinations while clinically sober. Expresses interest in seeing psychiatrist/psychologist and obtaining referral for alcohol cessation programs. will provide this info at discharge. MD CHO:  I received s/o on this pt from Dr. Carrillo.  Plan:  reassess for sobriety, assess for suicidality, repeat VS for tachycardia.  Pt is calm cooperative, pleasant demeanor, ambulating with ease, taking po.  States his tachycardia is known chronic condition, follows closely with cardiologist, has not taken antihypertensives in past few days, has enough medications at home.  Agrees to take medications, stressed importance of bp control, he will f/u with his cardiologist at the next available appointment.  Denies suicidal ideations, a/v halluc.  No tremor, tongue fascic, diaphoresis, vomiting.  Discharge home with family member to . Jodi Borja MD PGY-2 pt clinically sober. ambulatory. HR 120s but patient states his HR is always elevated, follows with cardiologist Dr. Morrell. Has not taken his amlodipine in 2 days, although he has enough medication. No concern for infectious or dangerous causes of tachycardia at this time. Calm and cooperative. No tongue fasiculations. Spoke with sister, who will pick patient up approx 3 AM. Patient denies SI/HI, auditory or visual hallucinations while clinically sober. Expresses interest in seeing psychiatrist/psychologist and obtaining referral for alcohol cessation programs. will provide this info at discharge. Cr at baseline. Pt informed to f/u with his PCP regarding the possible need for thyroid medications given his elevated tsh

## 2020-05-07 NOTE — ED PROVIDER NOTE - EXTREMITY EXAM
no deformity, pain or tenderness, no restriction of movement/UE and LE normal range of motion b/l, no pain or joint tenderness b/l

## 2020-05-07 NOTE — ED PROVIDER NOTE - CLINICAL SUMMARY MEDICAL DECISION MAKING FREE TEXT BOX
A:   - 58yo M with PMHx of HTN, depression, etoh use presents for depression. Pt s/p fall secondary etoh intoxication.   P:  - labs, fluids, EKG, CT head, reassess A:  - 58yo M with PMHx of HTN, depression, etoh use presents for depression. Pt s/p fall secondary etoh intoxication.   P: - labs, fluids, EKG, CT head, reassess    lavern: 58 yo man presents for alcohol intoxication and trip and fall, denies hitting his head, has small abrasion on right knee, otherwise no sign of trauma.  pt tachycardic, 130's now, 120's on ecg.  hydrated x 1 liter, will continue hydration.  pt awake and alert and can speak, but etoh level still very high and continues with tachycardia.  will send tsh and eval for withdrawal as we observe

## 2020-05-08 VITALS
OXYGEN SATURATION: 97 % | SYSTOLIC BLOOD PRESSURE: 187 MMHG | DIASTOLIC BLOOD PRESSURE: 97 MMHG | TEMPERATURE: 99 F | HEART RATE: 120 BPM | RESPIRATION RATE: 15 BRPM

## 2020-05-08 LAB — T4 AB SER-ACNC: 6.09 UG/DL — SIGNIFICANT CHANGE UP (ref 5.1–13)

## 2020-10-02 ENCOUNTER — APPOINTMENT (OUTPATIENT)
Dept: CARDIOLOGY | Facility: CLINIC | Age: 60
End: 2020-10-02

## 2020-10-16 ENCOUNTER — APPOINTMENT (OUTPATIENT)
Dept: CARDIOLOGY | Facility: CLINIC | Age: 60
End: 2020-10-16

## 2021-11-24 ENCOUNTER — RX RENEWAL (OUTPATIENT)
Age: 61
End: 2021-11-24

## 2022-02-10 ENCOUNTER — RX RENEWAL (OUTPATIENT)
Age: 62
End: 2022-02-10

## 2022-02-25 ENCOUNTER — INPATIENT (INPATIENT)
Facility: HOSPITAL | Age: 62
LOS: 3 days | Discharge: NURSING, REHAB OR LONG-TERM CARE | DRG: 309 | End: 2022-03-01
Attending: FAMILY MEDICINE | Admitting: INTERNAL MEDICINE
Payer: MEDICARE

## 2022-02-25 VITALS
RESPIRATION RATE: 16 BRPM | DIASTOLIC BLOOD PRESSURE: 68 MMHG | WEIGHT: 235.89 LBS | SYSTOLIC BLOOD PRESSURE: 106 MMHG | HEART RATE: 100 BPM | OXYGEN SATURATION: 99 % | TEMPERATURE: 98 F | HEIGHT: 71 IN

## 2022-02-25 DIAGNOSIS — I48.91 UNSPECIFIED ATRIAL FIBRILLATION: ICD-10-CM

## 2022-02-25 DIAGNOSIS — I50.9 HEART FAILURE, UNSPECIFIED: ICD-10-CM

## 2022-02-25 DIAGNOSIS — Z92.89 PERSONAL HISTORY OF OTHER MEDICAL TREATMENT: ICD-10-CM

## 2022-02-25 LAB
ALBUMIN SERPL ELPH-MCNC: 3.5 G/DL — SIGNIFICANT CHANGE UP (ref 3.3–5.2)
ALP SERPL-CCNC: 87 U/L — SIGNIFICANT CHANGE UP (ref 40–120)
ALT FLD-CCNC: 57 U/L — HIGH
ANION GAP SERPL CALC-SCNC: 12 MMOL/L — SIGNIFICANT CHANGE UP (ref 5–17)
APTT BLD: 25.9 SEC — LOW (ref 27.5–35.5)
AST SERPL-CCNC: 65 U/L — HIGH
BASOPHILS # BLD AUTO: 0.03 K/UL — SIGNIFICANT CHANGE UP (ref 0–0.2)
BASOPHILS NFR BLD AUTO: 0.8 % — SIGNIFICANT CHANGE UP (ref 0–2)
BILIRUB SERPL-MCNC: <0.2 MG/DL — LOW (ref 0.4–2)
BUN SERPL-MCNC: 19.7 MG/DL — SIGNIFICANT CHANGE UP (ref 8–20)
CALCIUM SERPL-MCNC: 9 MG/DL — SIGNIFICANT CHANGE UP (ref 8.6–10.2)
CHLORIDE SERPL-SCNC: 109 MMOL/L — HIGH (ref 98–107)
CO2 SERPL-SCNC: 19 MMOL/L — LOW (ref 22–29)
CREAT SERPL-MCNC: 1.49 MG/DL — HIGH (ref 0.5–1.3)
D DIMER BLD IA.RAPID-MCNC: 198 NG/ML DDU — SIGNIFICANT CHANGE UP
EOSINOPHIL # BLD AUTO: 0.01 K/UL — SIGNIFICANT CHANGE UP (ref 0–0.5)
EOSINOPHIL NFR BLD AUTO: 0.3 % — SIGNIFICANT CHANGE UP (ref 0–6)
GLUCOSE SERPL-MCNC: 153 MG/DL — HIGH (ref 70–99)
HCT VFR BLD CALC: 30.8 % — LOW (ref 39–50)
HGB BLD-MCNC: 9.6 G/DL — LOW (ref 13–17)
IMM GRANULOCYTES NFR BLD AUTO: 0.8 % — SIGNIFICANT CHANGE UP (ref 0–1.5)
INR BLD: 1.02 RATIO — SIGNIFICANT CHANGE UP (ref 0.88–1.16)
LYMPHOCYTES # BLD AUTO: 0.57 K/UL — LOW (ref 1–3.3)
LYMPHOCYTES # BLD AUTO: 14.3 % — SIGNIFICANT CHANGE UP (ref 13–44)
MAGNESIUM SERPL-MCNC: 1.6 MG/DL — SIGNIFICANT CHANGE UP (ref 1.6–2.6)
MCHC RBC-ENTMCNC: 31.2 GM/DL — LOW (ref 32–36)
MCHC RBC-ENTMCNC: 32 PG — SIGNIFICANT CHANGE UP (ref 27–34)
MCV RBC AUTO: 102.7 FL — HIGH (ref 80–100)
MONOCYTES # BLD AUTO: 0.2 K/UL — SIGNIFICANT CHANGE UP (ref 0–0.9)
MONOCYTES NFR BLD AUTO: 5 % — SIGNIFICANT CHANGE UP (ref 2–14)
NEUTROPHILS # BLD AUTO: 3.15 K/UL — SIGNIFICANT CHANGE UP (ref 1.8–7.4)
NEUTROPHILS NFR BLD AUTO: 78.8 % — HIGH (ref 43–77)
NT-PROBNP SERPL-SCNC: 908 PG/ML — HIGH (ref 0–300)
OB PNL STL: NEGATIVE — SIGNIFICANT CHANGE UP
PLATELET # BLD AUTO: 316 K/UL — SIGNIFICANT CHANGE UP (ref 150–400)
POTASSIUM SERPL-MCNC: 5 MMOL/L — SIGNIFICANT CHANGE UP (ref 3.5–5.3)
POTASSIUM SERPL-SCNC: 5 MMOL/L — SIGNIFICANT CHANGE UP (ref 3.5–5.3)
PROT SERPL-MCNC: 6.7 G/DL — SIGNIFICANT CHANGE UP (ref 6.6–8.7)
PROTHROM AB SERPL-ACNC: 11.8 SEC — SIGNIFICANT CHANGE UP (ref 10.5–13.4)
RAPID RVP RESULT: SIGNIFICANT CHANGE UP
RBC # BLD: 3 M/UL — LOW (ref 4.2–5.8)
RBC # FLD: 13.9 % — SIGNIFICANT CHANGE UP (ref 10.3–14.5)
SARS-COV-2 RNA SPEC QL NAA+PROBE: SIGNIFICANT CHANGE UP
SODIUM SERPL-SCNC: 140 MMOL/L — SIGNIFICANT CHANGE UP (ref 135–145)
TROPONIN T SERPL-MCNC: <0.01 NG/ML — SIGNIFICANT CHANGE UP (ref 0–0.06)
WBC # BLD: 3.99 K/UL — SIGNIFICANT CHANGE UP (ref 3.8–10.5)
WBC # FLD AUTO: 3.99 K/UL — SIGNIFICANT CHANGE UP (ref 3.8–10.5)

## 2022-02-25 PROCEDURE — 93010 ELECTROCARDIOGRAM REPORT: CPT | Mod: 76

## 2022-02-25 PROCEDURE — 99285 EMERGENCY DEPT VISIT HI MDM: CPT

## 2022-02-25 PROCEDURE — 99223 1ST HOSP IP/OBS HIGH 75: CPT

## 2022-02-25 PROCEDURE — 71045 X-RAY EXAM CHEST 1 VIEW: CPT | Mod: 26

## 2022-02-25 RX ORDER — THIAMINE MONONITRATE (VIT B1) 100 MG
100 TABLET ORAL DAILY
Refills: 0 | Status: DISCONTINUED | OUTPATIENT
Start: 2022-02-25 | End: 2022-03-01

## 2022-02-25 RX ORDER — IPRATROPIUM/ALBUTEROL SULFATE 18-103MCG
3 AEROSOL WITH ADAPTER (GRAM) INHALATION
Refills: 0 | Status: COMPLETED | OUTPATIENT
Start: 2022-02-25 | End: 2022-02-25

## 2022-02-25 RX ORDER — HEPARIN SODIUM 5000 [USP'U]/ML
4000 INJECTION INTRAVENOUS; SUBCUTANEOUS EVERY 6 HOURS
Refills: 0 | Status: DISCONTINUED | OUTPATIENT
Start: 2022-02-25 | End: 2022-02-28

## 2022-02-25 RX ORDER — FOLIC ACID 0.8 MG
1 TABLET ORAL DAILY
Refills: 0 | Status: DISCONTINUED | OUTPATIENT
Start: 2022-02-25 | End: 2022-03-01

## 2022-02-25 RX ORDER — ONDANSETRON 8 MG/1
4 TABLET, FILM COATED ORAL EVERY 8 HOURS
Refills: 0 | Status: DISCONTINUED | OUTPATIENT
Start: 2022-02-25 | End: 2022-03-01

## 2022-02-25 RX ORDER — METOPROLOL TARTRATE 50 MG
25 TABLET ORAL
Refills: 0 | Status: DISCONTINUED | OUTPATIENT
Start: 2022-02-25 | End: 2022-02-27

## 2022-02-25 RX ORDER — AMLODIPINE BESYLATE 2.5 MG/1
10 TABLET ORAL DAILY
Refills: 0 | Status: DISCONTINUED | OUTPATIENT
Start: 2022-02-25 | End: 2022-03-01

## 2022-02-25 RX ORDER — HEPARIN SODIUM 5000 [USP'U]/ML
9000 INJECTION INTRAVENOUS; SUBCUTANEOUS ONCE
Refills: 0 | Status: COMPLETED | OUTPATIENT
Start: 2022-02-25 | End: 2022-02-25

## 2022-02-25 RX ORDER — HEPARIN SODIUM 5000 [USP'U]/ML
INJECTION INTRAVENOUS; SUBCUTANEOUS
Qty: 25000 | Refills: 0 | Status: DISCONTINUED | OUTPATIENT
Start: 2022-02-25 | End: 2022-02-26

## 2022-02-25 RX ORDER — LANOLIN ALCOHOL/MO/W.PET/CERES
1 CREAM (GRAM) TOPICAL
Qty: 0 | Refills: 0 | DISCHARGE

## 2022-02-25 RX ORDER — HEPARIN SODIUM 5000 [USP'U]/ML
9000 INJECTION INTRAVENOUS; SUBCUTANEOUS EVERY 6 HOURS
Refills: 0 | Status: DISCONTINUED | OUTPATIENT
Start: 2022-02-25 | End: 2022-02-28

## 2022-02-25 RX ORDER — LANOLIN ALCOHOL/MO/W.PET/CERES
3 CREAM (GRAM) TOPICAL AT BEDTIME
Refills: 0 | Status: DISCONTINUED | OUTPATIENT
Start: 2022-02-25 | End: 2022-03-01

## 2022-02-25 RX ORDER — ACETAMINOPHEN 500 MG
650 TABLET ORAL EVERY 6 HOURS
Refills: 0 | Status: DISCONTINUED | OUTPATIENT
Start: 2022-02-25 | End: 2022-03-01

## 2022-02-25 RX ORDER — TRAZODONE HCL 50 MG
1 TABLET ORAL
Qty: 0 | Refills: 0 | DISCHARGE

## 2022-02-25 RX ORDER — MAGNESIUM SULFATE 500 MG/ML
2 VIAL (ML) INJECTION ONCE
Refills: 0 | Status: COMPLETED | OUTPATIENT
Start: 2022-02-25 | End: 2022-02-25

## 2022-02-25 RX ORDER — AZITHROMYCIN 500 MG/1
500 TABLET, FILM COATED ORAL
Qty: 0 | Refills: 0 | DISCHARGE

## 2022-02-25 RX ADMIN — Medication 3 MILLILITER(S): at 14:12

## 2022-02-25 RX ADMIN — Medication 3 MILLILITER(S): at 13:54

## 2022-02-25 RX ADMIN — HEPARIN SODIUM 9000 UNIT(S): 5000 INJECTION INTRAVENOUS; SUBCUTANEOUS at 22:13

## 2022-02-25 RX ADMIN — Medication 25 GRAM(S): at 22:13

## 2022-02-25 RX ADMIN — Medication 3 MILLIGRAM(S): at 23:20

## 2022-02-25 RX ADMIN — Medication 50 MILLIGRAM(S): at 22:13

## 2022-02-25 RX ADMIN — Medication 50 MILLIGRAM(S): at 23:16

## 2022-02-25 RX ADMIN — Medication 104 MILLIGRAM(S): at 14:10

## 2022-02-25 RX ADMIN — HEPARIN SODIUM 1900 UNIT(S)/HR: 5000 INJECTION INTRAVENOUS; SUBCUTANEOUS at 22:41

## 2022-02-25 RX ADMIN — Medication 3 MILLILITER(S): at 14:03

## 2022-02-25 NOTE — CONSULT NOTE ADULT - PROBLEM SELECTOR RECOMMENDATION 9
Admit to Tele, check labs including TFTs, trend CE x3, ECG and CXR  - latest EKG w/ AFib rate controlled no ischemic changes, Tele monitor AFib w/ RVR 111bpm   - stable hemodynamically no need for urgent intervention, on Ativan taper  - [id number]= 1 (HF)   - placed on Metoprolol Tartrate 25mg PO BID by Medical team (caution is indicated w/ new HF and BB initiation)  - will schedule for transthoracic echocardiogram to evaluate associated cardiac conditions (valvular heart disease, cardiomyopathy, or left atrial dilation)  - placed on Heparin infusion for AC for now, keep PTT 50-80 sec (ability to comply with medical therapies might come to forefront)  - keep K> 4.0 and Mg> 2.0 received Magnesium Sulfate 2g IV x1   -  Long Term AC: "For patients with very low burden of AF (eg, AF that is well documented as limited to an isolated episode that may have been due to a reversible cause such as heavy alcohol ingestion, or sleep deprivation), it may be reasonable to forgo chronic OAC and institute close surveillance for recurrent AF, although it may not be possible to reliably estimate AF burden from surveying symptoms or infrequent monitoring"  - will observe patient on Tele if converts back to NSR within next 24 hours on Ativan taper

## 2022-02-25 NOTE — H&P ADULT - NSHPPHYSICALEXAM_GEN_ALL_CORE
Vital Signs Last 24 Hrs  T(C): 36.1 (25 Feb 2022 19:28), Max: 36.6 (25 Feb 2022 15:57)  T(F): 97 (25 Feb 2022 19:28), Max: 97.9 (25 Feb 2022 15:57)  HR: 102 (25 Feb 2022 19:28) (100 - 106)  BP: 117/70 (25 Feb 2022 19:28) (106/68 - 117/70)  BP(mean): --  RR: 16 (25 Feb 2022 19:28) (15 - 16)  SpO2: 97% (25 Feb 2022 19:28) (97% - 99%)    Constitutional: Well-appearing, NAD, VSS  Head: NC/AT  Eyes: PERRL, EOMI, anicteric sclera, conjunctiva WNL  ENT: Normal Pharynx, MMM, No tonsillar exudate/erythema  Neck: Supple, Non-tender  Chest: Non-tender, no rashes  Cardio: Irregular Irregular, s1/s2  Resp: BS +bibasilar crackles +rhonchi  Abd: Soft, Non-tender, Non-distended, no rebound/guarding/rigidity  : not examined  Rectal: not examined  MSK: moving all extremities, no motor weakness, full ROM x4  Ext: palpable distal pulses, good capillary refill   Psych: appropriate, cooperative  Neuro: CN II-XII grossly intact, no focal deficits  Skin: Warm/Dry. No rashes.

## 2022-02-25 NOTE — ED ADULT NURSE REASSESSMENT NOTE - NS ED NURSE REASSESS COMMENT FT1
Report received from NADIRA Parisi.  Pt resting at this time, pt in no distress. Pt denies chest pain/discomfort. Pt eating at this time.

## 2022-02-25 NOTE — H&P ADULT - ASSESSMENT
ASSESSMENT:  61M with PMHX HTN, Gout, ETOH Abuse sent from [facility] on Ativan taper for Cough/SOB/Dizziness admitted for Acute CHF Exacerbation 2/2 New Onset AFIB RVR and ARF and ETOH WD Syndrome.     PLAN:  ETOH Abuse c/b ETOH WD Syndrome  -Ativan Taper as outpt  -CIWA currently controlled  -Chlordizaepoxide Taper while inpt  -Ativan IVP PRN CIWA >8  -VTE PPX  -Likely back to [facility] after admission  -COVID/RVP Negative    New Onset AFIB RVR  -Probably uncontrolled AFIB induced CHF   -EKG AFIB CVR 89bpm   -Telemetry AFIB RVR 111bpm   -BP stable  -CXR +BL Pulm Vasc Congestion  -Start Metoprolol Tartrate 25mg PO BID  -Heparin infusion for AC for now  -Check Cardiac Eznymes  -Magnesium Sulfate 2g IV x1   -Check TTE  -Cardiology Consult ([place])    ARF  -Hold Allopurinol and other Nephrotoxins  -Check Renal US  -Likely decreased renal perfusion from AFIB  -Check CPK  -Check UA  -Trend BMP    NAGMA  -Avoid further IVF. Trend BMP    Gout  -Hold Allopurinol with ARF

## 2022-02-25 NOTE — ED ADULT TRIAGE NOTE - CHIEF COMPLAINT QUOTE
pt sent in from [facility], was diagnosed with pleural effusion yesterday and had "abnormal labs", pt c/o shortness of breath.  resp even and unlabored, denies chest pain.

## 2022-02-25 NOTE — H&P ADULT - HISTORY OF PRESENT ILLNESS
61M with PMHX HTN, Gout, ETOH Abuse sent from [facility] on Ativan taper for Cough/SOB/Dizziness. Patient found to have ARF and Volume Overload on CXR. EKG with new onset AFIB and Telemetry showing persistent AFIB RVR 110s. Pt seen/examined. Med list confirmed. Currently on ativan taper for acute etoh wd syndrome at facility. CIWA controlled at time of admission. Patient has been at [facility] for 1-2 days only. Recent ETOH binge drinking. No prior hx arrhythmia. ROS Negative unless mentioned.    PMHX: HTN, Gout, ETOH Abuse  PSHX: None  FamHx: no fam hx HTN  Social Hx: +Former Smoker 30 years ago. +ETOH Abuse last drink 2 days ago.

## 2022-02-25 NOTE — ED PROVIDER NOTE - ATTENDING CONTRIBUTION TO CARE
61yoM; with PMH signif for Sarcoidosis, Alcohol Abuse, HTN, Gout; now p/w sob x3days.  patient with increasing sob and dry cough over the past 3-4 days.  states he was given prednisone, azithromycin, and albuterol inhaler over past 2 days, but worsening sob.  patient sent in from detox for worsening creatinine (1.4).  patient denies chest pain, palpitations.  denies f/c/s. denies travel. denies nausea/vomiting.  General:  mild resp distress  Head:     NC/AT, EOMI, oral mucosa moist  Neck:     trachea midline  Lungs:    diffuse exp wheezing  CVS:     S1S2, RRR, no m/g/r  Abd:     +BS, s/nt/nd, no organomegaly  Ext:    2+ radial and pedal pulses, no c/c/e  Neuro: AAOx3, no sensory/motor deficits  A/P:  61yoM p/w sob x3 days  -labs, xray, nebs, re-eval

## 2022-02-25 NOTE — ED PROVIDER NOTE - PROGRESS NOTE DETAILS
Hammond: patient endorsing continued shortness of breath. spoke to hospitalist who is requesting abg/d-dimer for further management/evaluation.

## 2022-02-25 NOTE — ED PROVIDER NOTE - NS ED ROS FT
General: Denies fever, chills  HEENT: Denies sensory changes, sore throat  Neck: Denies neck pain, neck stiffness  Resp: Denies coughing, +SOB  Cardiovascular: Denies CP, palpitations, LE edema  GI: Denies nausea, vomiting, abdominal pain, diarrhea, constipation, blood in stool  : Denies dysuria, hematuria, frequency, incontinence  MSK: Denies back pain  Neuro: Denies HA, dizziness, numbness, weakness  Skin: Denies rashes.

## 2022-02-25 NOTE — ED ADULT NURSE REASSESSMENT NOTE - NS ED NURSE REASSESS COMMENT FT1
Report given to NADIRA Reed    VSS, pt in no distress. Pt denies palpitations, denies chest pain. Pt in no distress.

## 2022-02-25 NOTE — CONSULT NOTE ADULT - CONSULT REASON
Acute CHF Exacerbation, New Onset AFIB RVR New Onset A-Fib w/ RVR and heart faiure New Onset A-Fib w/ RVR and heart failure

## 2022-02-25 NOTE — ED PROVIDER NOTE - OBJECTIVE STATEMENT
61 hx of sarcoidosis, alcohol abuse, HTN, gout sent in from [facility] for shortness of breath for past 3 days. states he has had "wheezing" and a dry cough for the past several days. per documentation, was given prednisone, azithromycin and albuterol inhaler. also states he was sent in for abnormal labs, cr of 1.40 per paper work. states last drink was aprox 10 days ago, has been at the detox facility for the past 3 days. states he has been covid vaccinated x3.  denies trauma. Denies abdominal pain. Denies chest pain. Denies nausea/vomiting. Denies fever/chills.

## 2022-02-25 NOTE — ED ADULT NURSE NOTE - OBJECTIVE STATEMENT
Pt is a 61yom who arrived here from [facility] for abnormal BUN/Creatinine levels, pt is Pt is a&O x 4, pt states that he has a minor wheeze that started a few days ago, pt denies any fatigue, nausea, vomiting, pt states urinary and bowel patterns are normal, pt states that he has had dizziness for several months, but the only new symptom he has is wheezing which he is being treated with antibiotics for. Pt states he has a hx of alcoholism and hypertension, pt has not had a drink in 10 days, pt is 3 days into the detox facility.

## 2022-02-25 NOTE — ED PROVIDER NOTE - CLINICAL SUMMARY MEDICAL DECISION MAKING FREE TEXT BOX
61 hx of sarcoidosis, alcohol abuse, HTN, gout sent in from [facility] for shortness of breath for past 3 days. abnormal labs per documentation of cr 1.4. given azithro, prednisone and albuterol inhaler. lungs CTAB on arrival to the ER today.   cxr done at facility shows small pleural effusion.     will repeat labs, cxr. ekg, trop - low suspicion but will r/o aytpical ACS. 61 hx of sarcoidosis, alcohol abuse, HTN, gout sent in from [facility] for shortness of breath for past 3 days. abnormal labs per documentation of cr 1.4. given azithro, prednisone and albuterol inhaler. lungs CTAB on arrival to the ER today.   cxr done at facility shows small pleural effusion.     will repeat labs, cxr. ekg, trop - low suspicion but will r/o aytpical ACS. duo-nebs/prednisone

## 2022-02-25 NOTE — ED PROVIDER NOTE - CARE PLAN
1 Principal Discharge DX:	S/P alcohol detoxification  Secondary Diagnosis:	Shortness of breath   Principal Discharge DX:	New onset atrial fibrillation  Secondary Diagnosis:	Alcoholism

## 2022-02-25 NOTE — ED PROVIDER NOTE - PHYSICAL EXAMINATION
General: Well appearing female in no acute distress  HEENT: Normocephalic, atraumatic. Moist mucous membranes. Oropharynx clear. No lymphadenopathy.  Eyes: No scleral icterus. EOMI. BASSEM.  Neck:. Soft and supple. Full ROM without pain. No midline tenderness  Cardiac: Regular rate and regular rhythm. No murmurs, rubs, gallops. Peripheral pulses 2+ and symmetric. No LE edema.  Resp: Lungs CTAB. Speaking in full sentences. No wheezes, rales or rhonchi.  Abd: Soft, non-tender, non-distended. No guarding or rebound. No scars, masses, or lesions.  Back: Spine midline and non-tender. No CVA tenderness.    Skin: No rashes, abrasions, or lacerations.  Neuro: AO x 3. Moves all extremities symmetrically. Motor strength and sensation grossly intact.

## 2022-02-25 NOTE — CONSULT NOTE ADULT - PROBLEM SELECTOR RECOMMENDATION 2
Tele monitoring, check ECG and trend CE overnight,   - patient scheduled for ECHO in AM  to assess functional and structural status  - not in acute decompensated state, lying flat in bed w/o dyspnea or orthopnea  - will reassess in AM  - no need for chronic diuretic therapy as of now    case d/w Dr. Raines

## 2022-02-25 NOTE — CONSULT NOTE ADULT - ASSESSMENT
61M with PMHX HTN, Gout, ETOH Abuse sent from [facility] on Ativan taper for Cough/SOB/Dizziness admitted for Acute CHF Exacerbation 2/2 New Onset AFIB RVR and ARF and ETOH WD Syndrome.        60yo M with PMHX HTN, chronic ETOH use sent from [facility] on Ativan taper for dyspnea, dizziness and admitted w/ new onset Atrial Fibrillation w/ RVR and acute HF exacerbation, hemodynamically stable and no acute ECG changes.

## 2022-02-25 NOTE — ED ADULT NURSE NOTE - NURSING GU BLADDER
Asthma  In past- requires no treatment presently  Obesity (BMI 30-39.9)    Uterine polyp
non-distended

## 2022-02-25 NOTE — CONSULT NOTE ADULT - SUBJECTIVE AND OBJECTIVE BOX
[facility]                                                       [facility]                                                        Office: [address] [address]                                                       Telephone: [phone]. Fax:[phone]                                                              CARDIOLOGY CONSULTATION NOTE                                                                                                History obtained by: Patient and medical record     obtained: No    Chief complaint:  HF Exacerbation 2/2 New Onset AFIB RVR    HPI: Patient is a 62yo M with PMHx HTN, chronic ETOH use, tobacco sent from [facility] on Ativan taper for cough, dyspnea and dizziness.  In the EDU patient found to have new EKG findings with new onset A-Fib and on Tele persistent A-Fib w/ RVR 110s.  Patient on Ativan taper for acute ETOH withdrawal syndrome at facility.  At [facility] for 1-2 days only after a recent ETOH binge drinking.  Denies prior history of arrhythmia.  Denies palpitations, CP, syncope or leg edema.    REVIEW OF SYMPTOMS:   Cardiovascular: See HPI. No chest pain, + dyspnea, No syncope, No palpitations, No dizziness, No Orthopnea, No Paroxsymal nocturnal dyspnea;    Respiratory: + Dyspnea, No cough,     Genitourinary: No dysuria, no hematuria;   Gastrointestinal: No nausea, no vomiting. No diarrhea, No abdominal pain. No dark color stool, no melena;   Neurological: No headache, no dizziness, no slurred speech;    Psychiatric: No agitation, + anxiety.  ALL OTHER REVIEW OF SYSTEMS ARE NEGATIVE.    ALLERGIES: No Known Allergies    CURRENT MEDICATIONS:  amLODIPine   Tablet 10 milliGRAM(s) Oral daily  metoprolol tartrate 25 milliGRAM(s) Oral two times a day  chlordiazePOXIDE  folic acid  heparin   Injectable  heparin  Infusion.  magnesium sulfate  IVPB  multivitamin  thiamine    HOME MEDICATIONS:  Multiple Vitamins oral capsule: Last Dose Taken:  , 1 cap(s) orally once a day  folic acid 1 mg oral tablet: Last Dose Taken:  , 1 tab(s) orally once a day  thiamine 100 mg oral tablet: 1 tab(s) orally once a day  amLODIPine 10 mg oral tablet: Last Dose Taken:  , 1 tab(s) orally once a day  allopurinol 100 mg oral tablet: Last Dose Taken:  , 1 tab(s) orally once a day    PAST MEDICAL HISTORY  Hypertension  MS (multiple sclerosis)  Hyperlipidemia  Gout    PAST SURGICAL HISTORY  No significant past surgical history    FAMILY HISTORY: None reported    SOCIAL HISTORY:  +smoking/ + alcohol daily/ denies drugs    Vital Signs Last 24 Hrs  T(C): 36.4 (25 Feb 2022 21:25), Max: 36.6 (25 Feb 2022 15:57)  T(F): 97.5 (25 Feb 2022 21:25), Max: 97.9 (25 Feb 2022 15:57)  HR: 105 (25 Feb 2022 21:25) (100 - 106)  BP: 147/79 (25 Feb 2022 21:25) (106/68 - 147/79)  BP(mean): --  RR: 18 (25 Feb 2022 21:25) (15 - 18)  SpO2: 100% (25 Feb 2022 21:25) (97% - 100%)    PHYSICAL EXAM:  Constitutional: Comfortable, no acute distress   HEENT: Atraumatic, neck is supple, no JVD   CNS: A&Ox3. No focal motor or sensory deficits  Respiratory: CTA b/l, no rales  Cardiovascular: S1S2, Irreg/irreg, No murmur/rubs  Gastrointestinal: Soft non-tender, +Bowel sounds  Extremities: No edema   Psychiatric: Calm, no agitation  Skin: No skin rash/ulcers visualized to face, hands or feet    Intake and output:     LABS:                        9.6    3.99  )-----------( 316      ( 25 Feb 2022 13:35 )             30.8     02-25    140  |  109<H>  |  19.7  ----------------------------<  153<H>  5.0   |  19.0<L>  |  1.49<H>    Ca    9.0      25 Feb 2022 13:35  Mg     1.6     02-25    TPro  6.7  /  Alb  3.5  /  TBili  <0.2<L>  /  DBili  x   /  AST  65<H>  /  ALT  57<H>  /  AlkPhos  87  02-25    CARDIAC MARKERS ( 25 Feb 2022 13:35 )  x     / <0.01 ng/mL / x     / x     / x        ;p-BNP=Serum Pro-Brain Natriuretic Peptide: 908 pg/mL (02-25 @ 13:35)    INTERPRETATION OF TELEMETRY: A fib w/ RVR   ECG: A fib, w/ 91 vent rate      RADIOLOGY & ADDITIONAL STUDIES:   X-ray:  Clear lungs    ECHO FINDINGS: Date:                                                                       [facility]                                                       [facility]                                                        Office: [address] [address]                                                       Telephone: [phone]. Fax:[phone]                                                              CARDIOLOGY CONSULTATION NOTE                                                                                                History obtained by: Patient and medical record     obtained: No    Chief complaint:  HF Exacerbation 2/2 New Onset AFIB RVR    HPI: Patient is a 62yo M with PMHx HTN, chronic ETOH use, tobacco sent from [facility] on Ativan taper for cough, dyspnea and dizziness.  In the EDU patient found to have new EKG changes c/w Atrial-Fibrillation and on Tele persistent A-Fib w/ RVR 110s.  Patient placed on Ativan taper at the facility for acute ETOH withdrawal syndrome.  At [facility] for 1-2 days only after a recent ETOH drinking binge.  Earlier had mild dyspnea, nor resting comfortable, denies prior history of arrhythmia, palpitations, CP, syncope, dyspnea or leg edema.    REVIEW OF SYMPTOMS:   Cardiovascular: See HPI. No chest pain, + dyspnea, No syncope, No palpitations, No dizziness, No Orthopnea, No Paroxsymal nocturnal dyspnea;    Respiratory: + Dyspnea, No cough,     Genitourinary: No dysuria, no hematuria;   Gastrointestinal: No nausea, no vomiting. No diarrhea, No abdominal pain. No dark color stool, no melena;   Neurological: No headache, no dizziness, no slurred speech;    Psychiatric: No agitation, + anxiety.  ALL OTHER REVIEW OF SYSTEMS ARE NEGATIVE.    ALLERGIES: No Known Allergies    CURRENT MEDICATIONS:  amLODIPine   Tablet 10 milliGRAM(s) Oral daily  metoprolol tartrate 25 milliGRAM(s) Oral two times a day  chlordiazePOXIDE  folic acid  heparin   Injectable  heparin  Infusion.  magnesium sulfate  IVPB  multivitamin  thiamine    HOME MEDICATIONS:  Multiple Vitamins oral capsule: Last Dose Taken:  , 1 cap(s) orally once a day  folic acid 1 mg oral tablet: Last Dose Taken:  , 1 tab(s) orally once a day  thiamine 100 mg oral tablet: 1 tab(s) orally once a day  amLODIPine 10 mg oral tablet: Last Dose Taken:  , 1 tab(s) orally once a day  allopurinol 100 mg oral tablet: Last Dose Taken:  , 1 tab(s) orally once a day    PAST MEDICAL HISTORY  Hypertension  MS (multiple sclerosis)  Hyperlipidemia  Gout    PAST SURGICAL HISTORY  No significant past surgical history    FAMILY HISTORY: None reported    SOCIAL HISTORY:  +smoking/ + alcohol daily/ denies drugs    Vital Signs Last 24 Hrs  T(C): 36.4 (25 Feb 2022 21:25), Max: 36.6 (25 Feb 2022 15:57)  T(F): 97.5 (25 Feb 2022 21:25), Max: 97.9 (25 Feb 2022 15:57)  HR: 105 (25 Feb 2022 21:25) (100 - 106)  BP: 147/79 (25 Feb 2022 21:25) (106/68 - 147/79)  BP(mean): --  RR: 18 (25 Feb 2022 21:25) (15 - 18)  SpO2: 100% (25 Feb 2022 21:25) (97% - 100%)    PHYSICAL EXAM:  Constitutional: Comfortable, no acute distress   HEENT: Atraumatic, neck is supple, no JVD   CNS: A&Ox3. No focal motor or sensory deficits  Respiratory: CTA b/l, no rales  Cardiovascular: S1S2, Irreg/irreg, No murmur/rubs  Gastrointestinal: Soft non-tender, +Bowel sounds  Extremities: No edema   Psychiatric: Calm, no agitation  Skin: No skin rash/ulcers visualized to face, hands or feet    Intake and output:     LABS:                        9.6    3.99  )-----------( 316      ( 25 Feb 2022 13:35 )             30.8     02-25    140  |  109<H>  |  19.7  ----------------------------<  153<H>  5.0   |  19.0<L>  |  1.49<H>    Ca    9.0      25 Feb 2022 13:35  Mg     1.6     02-25    TPro  6.7  /  Alb  3.5  /  TBili  <0.2<L>  /  DBili  x   /  AST  65<H>  /  ALT  57<H>  /  AlkPhos  87  02-25    CARDIAC MARKERS ( 25 Feb 2022 13:35 )  x     / <0.01 ng/mL / x     / x     / x        ;p-BNP=Serum Pro-Brain Natriuretic Peptide: 908 pg/mL (02-25 @ 13:35)    INTERPRETATION OF TELEMETRY: A fib w/ RVR   ECG: A fib, w/ 91 vent rate      RADIOLOGY & ADDITIONAL STUDIES:   X-ray:  Clear lungs    ECHO FINDINGS: Date:

## 2022-02-26 LAB
ALBUMIN SERPL ELPH-MCNC: 3.4 G/DL — SIGNIFICANT CHANGE UP (ref 3.3–5.2)
ALP SERPL-CCNC: 74 U/L — SIGNIFICANT CHANGE UP (ref 40–120)
ALT FLD-CCNC: 50 U/L — HIGH
ANION GAP SERPL CALC-SCNC: 15 MMOL/L — SIGNIFICANT CHANGE UP (ref 5–17)
APTT BLD: 165.8 SEC — CRITICAL HIGH (ref 27.5–35.5)
APTT BLD: 62.4 SEC — HIGH (ref 27.5–35.5)
AST SERPL-CCNC: 35 U/L — SIGNIFICANT CHANGE UP
BASOPHILS # BLD AUTO: 0.01 K/UL — SIGNIFICANT CHANGE UP (ref 0–0.2)
BASOPHILS NFR BLD AUTO: 0.1 % — SIGNIFICANT CHANGE UP (ref 0–2)
BILIRUB SERPL-MCNC: 0.2 MG/DL — LOW (ref 0.4–2)
BUN SERPL-MCNC: 22.9 MG/DL — HIGH (ref 8–20)
CALCIUM SERPL-MCNC: 9.1 MG/DL — SIGNIFICANT CHANGE UP (ref 8.6–10.2)
CHLORIDE SERPL-SCNC: 111 MMOL/L — HIGH (ref 98–107)
CK SERPL-CCNC: 41 U/L — SIGNIFICANT CHANGE UP (ref 30–200)
CO2 SERPL-SCNC: 18 MMOL/L — LOW (ref 22–29)
CREAT SERPL-MCNC: 1.45 MG/DL — HIGH (ref 0.5–1.3)
EOSINOPHIL # BLD AUTO: 0 K/UL — SIGNIFICANT CHANGE UP (ref 0–0.5)
EOSINOPHIL NFR BLD AUTO: 0 % — SIGNIFICANT CHANGE UP (ref 0–6)
GLUCOSE SERPL-MCNC: 146 MG/DL — HIGH (ref 70–99)
HCT VFR BLD CALC: 31.6 % — LOW (ref 39–50)
HCT VFR BLD CALC: 32.5 % — LOW (ref 39–50)
HCV AB S/CO SERPL IA: 0.1 S/CO — SIGNIFICANT CHANGE UP (ref 0–0.99)
HCV AB SERPL-IMP: SIGNIFICANT CHANGE UP
HGB BLD-MCNC: 10.1 G/DL — LOW (ref 13–17)
HGB BLD-MCNC: 9.9 G/DL — LOW (ref 13–17)
IMM GRANULOCYTES NFR BLD AUTO: 0.6 % — SIGNIFICANT CHANGE UP (ref 0–1.5)
LYMPHOCYTES # BLD AUTO: 0.67 K/UL — LOW (ref 1–3.3)
LYMPHOCYTES # BLD AUTO: 8.6 % — LOW (ref 13–44)
MAGNESIUM SERPL-MCNC: 2.1 MG/DL — SIGNIFICANT CHANGE UP (ref 1.8–2.6)
MCHC RBC-ENTMCNC: 31.1 GM/DL — LOW (ref 32–36)
MCHC RBC-ENTMCNC: 31.3 GM/DL — LOW (ref 32–36)
MCHC RBC-ENTMCNC: 32 PG — SIGNIFICANT CHANGE UP (ref 27–34)
MCHC RBC-ENTMCNC: 32.7 PG — SIGNIFICANT CHANGE UP (ref 27–34)
MCV RBC AUTO: 102.8 FL — HIGH (ref 80–100)
MCV RBC AUTO: 104.3 FL — HIGH (ref 80–100)
MONOCYTES # BLD AUTO: 0.28 K/UL — SIGNIFICANT CHANGE UP (ref 0–0.9)
MONOCYTES NFR BLD AUTO: 3.6 % — SIGNIFICANT CHANGE UP (ref 2–14)
NEUTROPHILS # BLD AUTO: 6.82 K/UL — SIGNIFICANT CHANGE UP (ref 1.8–7.4)
NEUTROPHILS NFR BLD AUTO: 87.1 % — HIGH (ref 43–77)
PHOSPHATE SERPL-MCNC: 3.5 MG/DL — SIGNIFICANT CHANGE UP (ref 2.4–4.7)
PLATELET # BLD AUTO: 335 K/UL — SIGNIFICANT CHANGE UP (ref 150–400)
PLATELET # BLD AUTO: 343 K/UL — SIGNIFICANT CHANGE UP (ref 150–400)
POTASSIUM SERPL-MCNC: 5.1 MMOL/L — SIGNIFICANT CHANGE UP (ref 3.5–5.3)
POTASSIUM SERPL-SCNC: 5.1 MMOL/L — SIGNIFICANT CHANGE UP (ref 3.5–5.3)
PROT SERPL-MCNC: 6.4 G/DL — LOW (ref 6.6–8.7)
RBC # BLD: 3.03 M/UL — LOW (ref 4.2–5.8)
RBC # BLD: 3.16 M/UL — LOW (ref 4.2–5.8)
RBC # FLD: 13.8 % — SIGNIFICANT CHANGE UP (ref 10.3–14.5)
RBC # FLD: 14.3 % — SIGNIFICANT CHANGE UP (ref 10.3–14.5)
SODIUM SERPL-SCNC: 144 MMOL/L — SIGNIFICANT CHANGE UP (ref 135–145)
TROPONIN T SERPL-MCNC: <0.01 NG/ML — SIGNIFICANT CHANGE UP (ref 0–0.06)
TSH SERPL-MCNC: 1.59 UIU/ML — SIGNIFICANT CHANGE UP (ref 0.27–4.2)
WBC # BLD: 7.83 K/UL — SIGNIFICANT CHANGE UP (ref 3.8–10.5)
WBC # BLD: 7.83 K/UL — SIGNIFICANT CHANGE UP (ref 3.8–10.5)
WBC # FLD AUTO: 7.83 K/UL — SIGNIFICANT CHANGE UP (ref 3.8–10.5)
WBC # FLD AUTO: 7.83 K/UL — SIGNIFICANT CHANGE UP (ref 3.8–10.5)

## 2022-02-26 PROCEDURE — 76775 US EXAM ABDO BACK WALL LIM: CPT | Mod: 26

## 2022-02-26 PROCEDURE — 99222 1ST HOSP IP/OBS MODERATE 55: CPT

## 2022-02-26 PROCEDURE — 99233 SBSQ HOSP IP/OBS HIGH 50: CPT

## 2022-02-26 RX ORDER — HEPARIN SODIUM 5000 [USP'U]/ML
1600 INJECTION INTRAVENOUS; SUBCUTANEOUS
Qty: 25000 | Refills: 0 | Status: DISCONTINUED | OUTPATIENT
Start: 2022-02-26 | End: 2022-02-28

## 2022-02-26 RX ADMIN — Medication 1 MILLIGRAM(S): at 11:55

## 2022-02-26 RX ADMIN — HEPARIN SODIUM 0 UNIT(S)/HR: 5000 INJECTION INTRAVENOUS; SUBCUTANEOUS at 08:34

## 2022-02-26 RX ADMIN — AMLODIPINE BESYLATE 10 MILLIGRAM(S): 2.5 TABLET ORAL at 05:12

## 2022-02-26 RX ADMIN — Medication 1 TABLET(S): at 11:55

## 2022-02-26 RX ADMIN — Medication 50 MILLIGRAM(S): at 05:13

## 2022-02-26 RX ADMIN — Medication 25 MILLIGRAM(S): at 05:12

## 2022-02-26 RX ADMIN — HEPARIN SODIUM 1600 UNIT(S)/HR: 5000 INJECTION INTRAVENOUS; SUBCUTANEOUS at 16:47

## 2022-02-26 RX ADMIN — Medication 25 MILLIGRAM(S): at 18:53

## 2022-02-26 RX ADMIN — Medication 50 MILLIGRAM(S): at 11:54

## 2022-02-26 RX ADMIN — Medication 100 MILLIGRAM(S): at 11:55

## 2022-02-26 NOTE — PROGRESS NOTE ADULT - SUBJECTIVE AND OBJECTIVE BOX
[facility] Division of Hospital Medicine    Chief Complaint:  Acute CHF Exacerbation 2/2 New Onset AFIB RVR and ARF    SUBJECTIVE / OVERNIGHT EVENTS: No acute events overnight. HD stable. Denies chest pain, SOB, n/v/f/c/h. He endorses dizziness.     Patient denies chest pain, SOB, abd pain, N/V, fever, chills, dysuria or any other complaints. All remainder ROS negative.     MEDICATIONS  (STANDING):  amLODIPine   Tablet 10 milliGRAM(s) Oral daily  chlordiazePOXIDE 50 milliGRAM(s) Oral every 8 hours  chlordiazePOXIDE   Oral   folic acid 1 milliGRAM(s) Oral daily  heparin  Infusion. 1600 Unit(s)/Hr (16 mL/Hr) IV Continuous <Continuous>  metoprolol tartrate 25 milliGRAM(s) Oral two times a day  multivitamin 1 Tablet(s) Oral daily  thiamine 100 milliGRAM(s) Oral daily    MEDICATIONS  (PRN):  acetaminophen     Tablet .. 650 milliGRAM(s) Oral every 6 hours PRN Temp greater or equal to 38C (100.4F), Mild Pain (1 - 3)  aluminum hydroxide/magnesium hydroxide/simethicone Suspension 30 milliLiter(s) Oral every 4 hours PRN Dyspepsia  heparin   Injectable 9000 Unit(s) IV Push every 6 hours PRN For aPTT less than 40  heparin   Injectable 4000 Unit(s) IV Push every 6 hours PRN For aPTT between 40 - 57  LORazepam     Tablet 2 milliGRAM(s) Oral every 2 hours PRN Symptom-triggered 2 point increase in CIWA-Ar  LORazepam   Injectable 2 milliGRAM(s) IV Push every 1 hour PRN Symptom-triggered: each CIWA -Ar score 8 or GREATER  melatonin 3 milliGRAM(s) Oral at bedtime PRN Insomnia  ondansetron Injectable 4 milliGRAM(s) IV Push every 8 hours PRN Nausea and/or Vomiting        I&O's Summary    25 Feb 2022 07:01  -  26 Feb 2022 07:00  --------------------------------------------------------  IN: 0 mL / OUT: 600 mL / NET: -600 mL        PHYSICAL EXAM:  Vital Signs Last 24 Hrs  T(C): 37 (26 Feb 2022 10:08), Max: 37 (26 Feb 2022 10:08)  T(F): 98.6 (26 Feb 2022 10:08), Max: 98.6 (26 Feb 2022 10:08)  HR: 78 (26 Feb 2022 10:08) (78 - 107)  BP: 132/86 (26 Feb 2022 10:08) (106/68 - 147/79)  BP(mean): --  RR: 18 (26 Feb 2022 10:08) (15 - 18)  SpO2: 95% (26 Feb 2022 10:08) (95% - 100%)    Constitutional: Well-appearing, NAD  Cardio: Irregular Irregular, s1/s2  Resp: BS +bibasilar crackles +rhonchi  Abd: Soft, Non-tender, Non-distended, no rebound/guarding/rigidity  MSK: moving all extremities, no motor weakness, full ROM x4  Ext: palpable distal pulses, good capillary refill   Psych: appropriate, cooperative  Neuro: CN II-XII grossly intact, no focal deficits  Skin: Warm/Dry. No rashes.    LABS:                        10.1   7.83  )-----------( 343      ( 26 Feb 2022 07:09 )             32.5     02-26    144  |  111<H>  |  22.9<H>  ----------------------------<  146<H>  5.1   |  18.0<L>  |  1.45<H>    Ca    9.1      26 Feb 2022 07:09  Phos  3.5     02-26  Mg     2.1     02-26    TPro  6.4<L>  /  Alb  3.4  /  TBili  0.2<L>  /  DBili  x   /  AST  35  /  ALT  50<H>  /  AlkPhos  74  02-26    PT/INR - ( 25 Feb 2022 21:21 )   PT: 11.8 sec;   INR: 1.02 ratio         PTT - ( 26 Feb 2022 07:09 )  PTT:165.8 sec  CARDIAC MARKERS ( 26 Feb 2022 07:09 )  x     / <0.01 ng/mL / 41 U/L / x     / x      CARDIAC MARKERS ( 25 Feb 2022 13:35 )  x     / <0.01 ng/mL / x     / x     / x              CAPILLARY BLOOD GLUCOSE            RADIOLOGY & ADDITIONAL TESTS:  Results Reviewed:   Imaging Personally Reviewed:  Electrocardiogram Personally Reviewed:

## 2022-02-26 NOTE — PATIENT PROFILE ADULT - STATED REASON FOR ADMISSION
Pt in ER for opiate overdose.  Pt found shallow breathing and pale [place].  Pt given 2 mg intranasal narcan and 2 mg IV narcan via MFD.  Pt became alert and oriented.  Pt lethargic again in ER room.  Shallow breathing.  Pt states he snorted a drug but doesn't know it was.  Pt reports snorting drug at 0830 today.   
 abnormal labs

## 2022-02-26 NOTE — PATIENT PROFILE ADULT - FALL HARM RISK - HARM RISK INTERVENTIONS

## 2022-02-27 DIAGNOSIS — I10 ESSENTIAL (PRIMARY) HYPERTENSION: ICD-10-CM

## 2022-02-27 LAB
ALBUMIN SERPL ELPH-MCNC: 3.2 G/DL — LOW (ref 3.3–5.2)
ALP SERPL-CCNC: 67 U/L — SIGNIFICANT CHANGE UP (ref 40–120)
ALT FLD-CCNC: 115 U/L — HIGH
ANION GAP SERPL CALC-SCNC: 12 MMOL/L — SIGNIFICANT CHANGE UP (ref 5–17)
APTT BLD: 78.5 SEC — HIGH (ref 27.5–35.5)
AST SERPL-CCNC: 136 U/L — HIGH
BILIRUB SERPL-MCNC: 0.2 MG/DL — LOW (ref 0.4–2)
BUN SERPL-MCNC: 28.1 MG/DL — HIGH (ref 8–20)
CALCIUM SERPL-MCNC: 8.8 MG/DL — SIGNIFICANT CHANGE UP (ref 8.6–10.2)
CHLORIDE SERPL-SCNC: 109 MMOL/L — HIGH (ref 98–107)
CO2 SERPL-SCNC: 20 MMOL/L — LOW (ref 22–29)
CREAT SERPL-MCNC: 1.34 MG/DL — HIGH (ref 0.5–1.3)
GLUCOSE SERPL-MCNC: 100 MG/DL — HIGH (ref 70–99)
HCT VFR BLD CALC: 32.4 % — LOW (ref 39–50)
HGB BLD-MCNC: 10.1 G/DL — LOW (ref 13–17)
MCHC RBC-ENTMCNC: 31.2 GM/DL — LOW (ref 32–36)
MCHC RBC-ENTMCNC: 32 PG — SIGNIFICANT CHANGE UP (ref 27–34)
MCV RBC AUTO: 102.5 FL — HIGH (ref 80–100)
PLATELET # BLD AUTO: 358 K/UL — SIGNIFICANT CHANGE UP (ref 150–400)
POTASSIUM SERPL-MCNC: 4.2 MMOL/L — SIGNIFICANT CHANGE UP (ref 3.5–5.3)
POTASSIUM SERPL-SCNC: 4.2 MMOL/L — SIGNIFICANT CHANGE UP (ref 3.5–5.3)
PROT SERPL-MCNC: 6.2 G/DL — LOW (ref 6.6–8.7)
RBC # BLD: 3.16 M/UL — LOW (ref 4.2–5.8)
RBC # FLD: 14.2 % — SIGNIFICANT CHANGE UP (ref 10.3–14.5)
SODIUM SERPL-SCNC: 141 MMOL/L — SIGNIFICANT CHANGE UP (ref 135–145)
WBC # BLD: 7.82 K/UL — SIGNIFICANT CHANGE UP (ref 3.8–10.5)
WBC # FLD AUTO: 7.82 K/UL — SIGNIFICANT CHANGE UP (ref 3.8–10.5)

## 2022-02-27 PROCEDURE — 99232 SBSQ HOSP IP/OBS MODERATE 35: CPT

## 2022-02-27 PROCEDURE — 99233 SBSQ HOSP IP/OBS HIGH 50: CPT

## 2022-02-27 RX ORDER — METOPROLOL TARTRATE 50 MG
25 TABLET ORAL
Refills: 0 | Status: DISCONTINUED | OUTPATIENT
Start: 2022-02-27 | End: 2022-03-01

## 2022-02-27 RX ADMIN — Medication 25 MILLIGRAM(S): at 06:27

## 2022-02-27 RX ADMIN — Medication 100 MILLIGRAM(S): at 12:44

## 2022-02-27 RX ADMIN — HEPARIN SODIUM 1600 UNIT(S)/HR: 5000 INJECTION INTRAVENOUS; SUBCUTANEOUS at 00:25

## 2022-02-27 RX ADMIN — Medication 3 MILLIGRAM(S): at 00:28

## 2022-02-27 RX ADMIN — AMLODIPINE BESYLATE 10 MILLIGRAM(S): 2.5 TABLET ORAL at 06:27

## 2022-02-27 RX ADMIN — Medication 1 MILLIGRAM(S): at 12:43

## 2022-02-27 RX ADMIN — Medication 1 TABLET(S): at 12:43

## 2022-02-27 RX ADMIN — Medication 25 MILLIGRAM(S): at 17:51

## 2022-02-27 NOTE — PROGRESS NOTE ADULT - SUBJECTIVE AND OBJECTIVE BOX
[facility] Division of Hospital Medicine    Chief Complaint: A fib RVR     SUBJECTIVE / OVERNIGHT EVENTS:   Pt reports he feels well, no complaints     Patient denies chest pain, SOB, abd pain, N/V, fever, chills, dysuria or any other complaints. All remainder ROS negative.     MEDICATIONS  (STANDING):  amLODIPine   Tablet 10 milliGRAM(s) Oral daily  folic acid 1 milliGRAM(s) Oral daily  heparin  Infusion. 1600 Unit(s)/Hr (16 mL/Hr) IV Continuous <Continuous>  metoprolol tartrate 25 milliGRAM(s) Oral two times a day  multivitamin 1 Tablet(s) Oral daily  thiamine 100 milliGRAM(s) Oral daily    MEDICATIONS  (PRN):  acetaminophen     Tablet .. 650 milliGRAM(s) Oral every 6 hours PRN Temp greater or equal to 38C (100.4F), Mild Pain (1 - 3)  aluminum hydroxide/magnesium hydroxide/simethicone Suspension 30 milliLiter(s) Oral every 4 hours PRN Dyspepsia  heparin   Injectable 9000 Unit(s) IV Push every 6 hours PRN For aPTT less than 40  heparin   Injectable 4000 Unit(s) IV Push every 6 hours PRN For aPTT between 40 - 57  melatonin 3 milliGRAM(s) Oral at bedtime PRN Insomnia  ondansetron Injectable 4 milliGRAM(s) IV Push every 8 hours PRN Nausea and/or Vomiting        I&O's Summary      PHYSICAL EXAM:  Vital Signs Last 24 Hrs  T(C): 36.8 (27 Feb 2022 04:34), Max: 36.8 (26 Feb 2022 19:14)  T(F): 98.2 (27 Feb 2022 04:34), Max: 98.3 (26 Feb 2022 19:14)  HR: 76 (27 Feb 2022 04:34) (76 - 79)  BP: 131/84 (27 Feb 2022 04:34) (128/77 - 160/80)  BP(mean): --  RR: 18 (27 Feb 2022 04:34) (18 - 18)  SpO2: 93% (27 Feb 2022 04:34) (93% - 95%)      CONSTITUTIONAL: NAD, sitting up in bed  ENMT: Moist oral mucosa, PERRLA, EOMI   RESPIRATORY: Normal respiratory effort; lungs with fair air entry bilaterally, mildly reduced BS at bases   CARDIOVASCULAR: Irregular, No lower extremity edema  ABDOMEN: Nontender to palpation, normoactive bowel sounds  MUSCULOSKELETAL:  No clubbing or cyanosis of digits   PSYCH: A+O to person, place, and time; affect appropriate  NEUROLOGY: No gross sensory or motor deficits   SKIN: No rashes; no palpable lesions      LABS:                        10.1   7.82  )-----------( 358      ( 27 Feb 2022 06:16 )             32.4     02-27    141  |  109<H>  |  28.1<H>  ----------------------------<  100<H>  4.2   |  20.0<L>  |  1.34<H>    Ca    8.8      27 Feb 2022 06:16  Phos  3.5     02-26  Mg     2.1     02-26    TPro  6.2<L>  /  Alb  3.2<L>  /  TBili  0.2<L>  /  DBili  x   /  AST  136<H>  /  ALT  115<H>  /  AlkPhos  67  02-27    PT/INR - ( 25 Feb 2022 21:21 )   PT: 11.8 sec;   INR: 1.02 ratio         PTT - ( 26 Feb 2022 23:48 )  PTT:78.5 sec  CARDIAC MARKERS ( 26 Feb 2022 07:09 )  x     / <0.01 ng/mL / 41 U/L / x     / x      CARDIAC MARKERS ( 25 Feb 2022 13:35 )  x     / <0.01 ng/mL / x     / x     / x

## 2022-02-27 NOTE — PROGRESS NOTE ADULT - SUBJECTIVE AND OBJECTIVE BOX
[facility]                                                         CARDIOLOGY AT [facility]                                                                  [address] [facility], [facility] [place] [address]                                                         Telephone: [phone]. Fax:[phone]                                                                             PROGRESS NOTE    Reason for follow up: Follow up on Atrial fib  Update: Feels better  no further lightheadness      Review of symptoms:   Cardiac:  No chest pain. No dyspnea. No palpitations.  Respiratory: no cough. No dyspnea  Gastrointestinal: No diarrhea. No abdominal pain. No bleeding.   Neuro: No focal neuro complaints.      Vitals:  T(C): 36.8 (02-27-22 @ 04:34), Max: 37 (02-26-22 @ 10:08)  HR: 76 (02-27-22 @ 04:34) (76 - 79)  BP: 131/84 (02-27-22 @ 04:34) (128/77 - 160/80)  RR: 18 (02-27-22 @ 04:34) (18 - 18)  SpO2: 93% (02-27-22 @ 04:34) (93% - 95%)  Wt(kg): --  I&O's Summary    Weight (kg): 105.4 (02-25 @ 21:25)      PHYSICAL EXAM:  Appearance: Comfortable. No acute distress  HEENT:  Atraumatic. Normocephalic.  Normal oral mucosa  Neurologic: A & O x 3, no gross focal deficits.  Cardiovascular: Irregular S1 S2, No murmur, no rubs/gallops. No JVD  Respiratory: Lungs clear to auscultation, unlabored   Gastrointestinal:  Soft, Non-tender, + BS  Lower Extremities: No edema  Psychiatry: Patient is calm. No agitation.   Skin: warm and dry.      CURRENT MEDICATIONS:  MEDICATIONS  (STANDING):  amLODIPine   Tablet 10 milliGRAM(s) Oral daily  folic acid 1 milliGRAM(s) Oral daily  heparin  Infusion. 1600 Unit(s)/Hr (16 mL/Hr) IV Continuous <Continuous>  metoprolol tartrate 25 milliGRAM(s) Oral two times a day  multivitamin 1 Tablet(s) Oral daily  thiamine 100 milliGRAM(s) Oral daily        LABS:	 	  CARDIAC MARKERS ( 26 Feb 2022 07:09 )  x     / <0.01 ng/mL / 41 U/L / x     / x      p-BNP 26 Feb 2022 07:09: x    , CARDIAC MARKERS ( 25 Feb 2022 13:35 )  x     / <0.01 ng/mL / x     / x     / x      p-BNP 25 Feb 2022 13:35: 908 pg/mL                          10.1   7.82  )-----------( 358      ( 27 Feb 2022 06:16 )             32.4     02-27    141  |  109<H>  |  28.1<H>  ----------------------------<  100<H>  4.2   |  20.0<L>  |  1.34<H>    Ca    8.8      27 Feb 2022 06:16  Phos  3.5     02-26  Mg     2.1     02-26    TPro  6.2<L>  /  Alb  3.2<L>  /  TBili  0.2<L>  /  DBili  x   /  AST  136<H>  /  ALT  115<H>  /  AlkPhos  67  02-27    proBNP: Serum Pro-Brain Natriuretic Peptide: 908 pg/mL (02-25 @ 13:35)    TSH: Thyroid Stimulating Hormone, Serum: 1.59 uIU/mL    TELEMETRY: atrial fib with controlled kary response      DIAGNOSTIC TESTING:  Echo done results are pending                                                                [facility]                                                         CARDIOLOGY AT [facility]                                                                  [address] [facility], [facility] [place] [address]                                                         Telephone: [phone]. Fax:[phone]                                                                             PROGRESS NOTE    Reason for follow up: Follow up on Atrial fib  Update: Feels better  no further lightheadness      Review of symptoms:   Cardiac:  No chest pain. No dyspnea. No palpitations.  Respiratory: no cough. No dyspnea  Gastrointestinal: No diarrhea. No abdominal pain. No bleeding.   Neuro: No focal neuro complaints.      Vitals:  T(C): 36.8 (02-27-22 @ 04:34), Max: 37 (02-26-22 @ 10:08)  HR: 76 (02-27-22 @ 04:34) (76 - 79)  BP: 131/84 (02-27-22 @ 04:34) (128/77 - 160/80)  RR: 18 (02-27-22 @ 04:34) (18 - 18)  SpO2: 93% (02-27-22 @ 04:34) (93% - 95%)  Wt(kg): --  I&O's Summary    Weight (kg): 105.4 (02-25 @ 21:25)      PHYSICAL EXAM:  Appearance: Comfortable. No acute distress  HEENT:  Atraumatic. Normocephalic.  Normal oral mucosa  Neurologic: A & O x 3, no gross focal deficits.  Cardiovascular: Irregular S1 S2, No murmur, no rubs/gallops. No JVD  Respiratory: Lungs clear to auscultation, unlabored   Gastrointestinal:  Soft, Non-tender, + BS  Lower Extremities: No edema  Psychiatry: Patient is calm. No agitation.   Skin: warm and dry.      CURRENT MEDICATIONS:  MEDICATIONS  (STANDING):  amLODIPine   Tablet 10 milliGRAM(s) Oral daily  folic acid 1 milliGRAM(s) Oral daily  heparin  Infusion. 1600 Unit(s)/Hr (16 mL/Hr) IV Continuous <Continuous>  metoprolol tartrate 25 milliGRAM(s) Oral two times a day  multivitamin 1 Tablet(s) Oral daily  thiamine 100 milliGRAM(s) Oral daily        LABS:	 	  CARDIAC MARKERS ( 26 Feb 2022 07:09 )  x     / <0.01 ng/mL / 41 U/L / x     / x      p-BNP 26 Feb 2022 07:09: x    , CARDIAC MARKERS ( 25 Feb 2022 13:35 )  x     / <0.01 ng/mL / x     / x     / x      p-BNP 25 Feb 2022 13:35: 908 pg/mL                          10.1   7.82  )-----------( 358      ( 27 Feb 2022 06:16 )             32.4     02-27    141  |  109<H>  |  28.1<H>  ----------------------------<  100<H>  4.2   |  20.0<L>  |  1.34<H>    Ca    8.8      27 Feb 2022 06:16  Phos  3.5     02-26  Mg     2.1     02-26    TPro  6.2<L>  /  Alb  3.2<L>  /  TBili  0.2<L>  /  DBili  x   /  AST  136<H>  /  ALT  115<H>  /  AlkPhos  67  02-27    proBNP: Serum Pro-Brain Natriuretic Peptide: 908 pg/mL (02-25 @ 13:35)    TSH: Thyroid Stimulating Hormone, Serum: 1.59 uIU/mL    TELEMETRY: atrial fib with controlled kary response      DIAGNOSTIC TESTING:  Echo done results are pending    < from: TTE Echo Complete w/ Contrast w/ Doppler (02.27.22 @ 11:40) >  Summary:   1. Normal left ventricular internal cavity size.   2. Normal global left ventricular systolic function.   3. Left ventricular ejection fraction, by visual estimation, is 65 to   70%.   4. The mitral in-flow pattern reveals no discernable A-wave, therefore   no comment on diastolic function can be made.   5. Normal right ventricular size and function.   6. The left atrium is normal in size.   7. The right atrium is normal in size.   8. Mild mitral annular calcification.   9. Mild mitral valve regurgitation.  10. Mild thickening of the anterior and posterior mitral valve leaflets.  11. Normal trileaflet aortic valve with normal opening.  12. There is no evidence of pericardial effusion.    MD Valentín Electronically signed on 2/27/2022 at 2:35:11 PM        < end of copied text >

## 2022-02-27 NOTE — PROGRESS NOTE ADULT - PROBLEM SELECTOR PLAN 1
Will decide about nuclear stress test and long term anticoag  after checking his echo results are available  C/W metoprolol and heparin for now      CARDIAC MEDS  amLODIPine   Tablet 10 milliGRAM(s) Oral daily  heparin  Infusion. 1600 Unit(s)/Hr (16 mL/Hr) IV Continuous <Continuous>  metoprolol tartrate 25 milliGRAM(s) Oral two times a day

## 2022-02-28 ENCOUNTER — TRANSCRIPTION ENCOUNTER (OUTPATIENT)
Age: 62
End: 2022-02-28

## 2022-02-28 DIAGNOSIS — N18.30 CHRONIC KIDNEY DISEASE, STAGE 3 UNSPECIFIED: ICD-10-CM

## 2022-02-28 DIAGNOSIS — M10.9 GOUT, UNSPECIFIED: ICD-10-CM

## 2022-02-28 LAB
ALBUMIN SERPL ELPH-MCNC: 3 G/DL — LOW (ref 3.3–5.2)
ALP SERPL-CCNC: 76 U/L — SIGNIFICANT CHANGE UP (ref 40–120)
ALT FLD-CCNC: 99 U/L — HIGH
ANION GAP SERPL CALC-SCNC: 10 MMOL/L — SIGNIFICANT CHANGE UP (ref 5–17)
APTT BLD: 30.3 SEC — SIGNIFICANT CHANGE UP (ref 27.5–35.5)
APTT BLD: 43.2 SEC — HIGH (ref 27.5–35.5)
APTT BLD: 98.8 SEC — HIGH (ref 27.5–35.5)
AST SERPL-CCNC: 64 U/L — HIGH
BILIRUB SERPL-MCNC: 0.2 MG/DL — LOW (ref 0.4–2)
BUN SERPL-MCNC: 31 MG/DL — HIGH (ref 8–20)
CALCIUM SERPL-MCNC: 9.1 MG/DL — SIGNIFICANT CHANGE UP (ref 8.6–10.2)
CHLORIDE SERPL-SCNC: 103 MMOL/L — SIGNIFICANT CHANGE UP (ref 98–107)
CO2 SERPL-SCNC: 23 MMOL/L — SIGNIFICANT CHANGE UP (ref 22–29)
CREAT SERPL-MCNC: 1.37 MG/DL — HIGH (ref 0.5–1.3)
GLUCOSE SERPL-MCNC: 99 MG/DL — SIGNIFICANT CHANGE UP (ref 70–99)
HCT VFR BLD CALC: 34.2 % — LOW (ref 39–50)
HGB BLD-MCNC: 10.7 G/DL — LOW (ref 13–17)
MCHC RBC-ENTMCNC: 31.3 GM/DL — LOW (ref 32–36)
MCHC RBC-ENTMCNC: 32.2 PG — SIGNIFICANT CHANGE UP (ref 27–34)
MCV RBC AUTO: 103 FL — HIGH (ref 80–100)
PLATELET # BLD AUTO: 403 K/UL — HIGH (ref 150–400)
POTASSIUM SERPL-MCNC: 4.6 MMOL/L — SIGNIFICANT CHANGE UP (ref 3.5–5.3)
POTASSIUM SERPL-SCNC: 4.6 MMOL/L — SIGNIFICANT CHANGE UP (ref 3.5–5.3)
PROT SERPL-MCNC: 6.5 G/DL — LOW (ref 6.6–8.7)
RBC # BLD: 3.32 M/UL — LOW (ref 4.2–5.8)
RBC # FLD: 14 % — SIGNIFICANT CHANGE UP (ref 10.3–14.5)
SODIUM SERPL-SCNC: 136 MMOL/L — SIGNIFICANT CHANGE UP (ref 135–145)
WBC # BLD: 6.75 K/UL — SIGNIFICANT CHANGE UP (ref 3.8–10.5)
WBC # FLD AUTO: 6.75 K/UL — SIGNIFICANT CHANGE UP (ref 3.8–10.5)

## 2022-02-28 PROCEDURE — 93016 CV STRESS TEST SUPVJ ONLY: CPT

## 2022-02-28 PROCEDURE — 99232 SBSQ HOSP IP/OBS MODERATE 35: CPT

## 2022-02-28 PROCEDURE — 78452 HT MUSCLE IMAGE SPECT MULT: CPT | Mod: 26

## 2022-02-28 PROCEDURE — 99233 SBSQ HOSP IP/OBS HIGH 50: CPT

## 2022-02-28 PROCEDURE — 93018 CV STRESS TEST I&R ONLY: CPT

## 2022-02-28 RX ORDER — ALLOPURINOL 300 MG
100 TABLET ORAL DAILY
Refills: 0 | Status: DISCONTINUED | OUTPATIENT
Start: 2022-02-28 | End: 2022-03-01

## 2022-02-28 RX ORDER — APIXABAN 2.5 MG/1
5 TABLET, FILM COATED ORAL EVERY 12 HOURS
Refills: 0 | Status: DISCONTINUED | OUTPATIENT
Start: 2022-02-28 | End: 2022-03-01

## 2022-02-28 RX ADMIN — HEPARIN SODIUM 1800 UNIT(S)/HR: 5000 INJECTION INTRAVENOUS; SUBCUTANEOUS at 13:05

## 2022-02-28 RX ADMIN — HEPARIN SODIUM 1800 UNIT(S)/HR: 5000 INJECTION INTRAVENOUS; SUBCUTANEOUS at 15:26

## 2022-02-28 RX ADMIN — APIXABAN 5 MILLIGRAM(S): 2.5 TABLET, FILM COATED ORAL at 18:20

## 2022-02-28 RX ADMIN — HEPARIN SODIUM 1800 UNIT(S)/HR: 5000 INJECTION INTRAVENOUS; SUBCUTANEOUS at 07:33

## 2022-02-28 RX ADMIN — AMLODIPINE BESYLATE 10 MILLIGRAM(S): 2.5 TABLET ORAL at 06:12

## 2022-02-28 RX ADMIN — Medication 1 MILLIGRAM(S): at 12:42

## 2022-02-28 RX ADMIN — Medication 1 TABLET(S): at 12:41

## 2022-02-28 RX ADMIN — Medication 25 MILLIGRAM(S): at 18:20

## 2022-02-28 RX ADMIN — Medication 100 MILLIGRAM(S): at 13:19

## 2022-02-28 NOTE — PROGRESS NOTE ADULT - PROBLEM SELECTOR PLAN 2
Controlled  Low na diet  c/w metoprolol and norvasc
Controlled  Low na diet  c/w metoprolol and norvasc

## 2022-02-28 NOTE — CONSULT NOTE ADULT - ASSESSMENT
Assessment:   61 year old male with PMH of HTN, gout, remote sarcoidosis of the lung, and ETOH abuse who was referred to [facility] ED from [facility] for 2 week hx of cough, wheezing, and dizziness. Pt states he has several month history of dizziness and shortness of breath, which has worsened over the past 2 weeks. He reports 3 prior episodes of syncope (last ~ 1 year ago) for which he did not seek medical attention. In the ED, found to have THOMAS and new onset AF. Denies palpitation or chest pain, denies fever, denies any other complaints at this time. Denies prior cardiac history.    1. New onset AF, rate controlled   - CHADS-VASc = 1 (HTN)   - continue heparin gtt for now, likely transition to oral AC prior to d/c   - consider ADY/DCCV   - continue beta blocker     2. THOMAS, improving   - avoid nephrotoxic medications    3. HTN   - continue CCB, BB   - cardiology following     4. ETOH abuse   - CIWA = 0, ativan discontinued   - plan as per medicine     Recommendations final when sign and reviewed with Attending Assessment:   61 year old male with PMH of HTN, gout, remote sarcoidosis of the lung, and ETOH abuse who was referred to [facility] ED from [facility] for 2 week hx of cough, wheezing, and dizziness. Pt states he has several month history of dizziness, shortness of breath, and dyspnea on exertion, which has worsened over the past 2 weeks. He reports 3 prior episodes of syncope (last ~ 1 year ago) for which he did not seek medical attention. In the ED, found to have THOMAS and new onset AF. Denies palpitation or chest pain, denies fever, denies any other complaints at this time. Denies prior cardiac history.    1. New onset AF, rate controlled   - CHADS-VASc = 1 (HTN)   - discontinue heparin gtt  - start Eliquis 5mg BID this evening   - NPO after midnight for ADY/DCCV tomorrow   - continue beta blocker     2. THOMAS, improving   - avoid nephrotoxic medications    3. HTN   - continue CCB, BB   - cardiology following     4. ETOH abuse   - CIWA = 0, ativan discontinued   - plan as per medicine     Seen and discussed with Dr. Thurman

## 2022-02-28 NOTE — DISCHARGE NOTE PROVIDER - PROVIDER TOKENS
FREE:[LAST:[abamier],FIRST:[violet],PHONE:[(   )    -],FAX:[(   )    -]] PROVIDER:[TOKEN:[[id number]],FOLLOWUP:[2 weeks]],PROVIDER:[TOKEN:[[id number]],FOLLOWUP:[Routine],ESTABLISHEDPATIENT:[T]],PROVIDER:[TOKEN:[[id number]],FOLLOWUP:[1 week],ESTABLISHEDPATIENT:[T]]

## 2022-02-28 NOTE — PROGRESS NOTE ADULT - PROBLEM SELECTOR PLAN 1
- new onset afib   - rate controlled  - continue beta blocker  - stress test negative   - DOAC upon discharge   - EP following plan for ADY/DCCV in AM   - NPO @ [facility]  - No further inpatient cardiac workup or interventions. Will sign off, please call or reconsult as necessary. Outpatient follow up w/

## 2022-02-28 NOTE — DISCHARGE NOTE PROVIDER - NSDCMRMEDTOKEN_GEN_ALL_CORE_FT
allopurinol 100 mg oral tablet: 1 tab(s) orally once a day  amLODIPine 10 mg oral tablet: 1 tab(s) orally once a day  folic acid 1 mg oral tablet: 1 tab(s) orally once a day  Multiple Vitamins oral capsule: 1 cap(s) orally once a day  thiamine 100 mg oral tablet: 1 tab(s) orally once a day   allopurinol 100 mg oral tablet: 1 tab(s) orally once a day  amLODIPine 10 mg oral tablet: 1 tab(s) orally once a day  apixaban 5 mg oral tablet: 1 tab(s) orally every 12 hours  flecainide 100 mg oral tablet: 1 tab(s) orally every 12 hours  folic acid 1 mg oral tablet: 1 tab(s) orally once a day  magnesium oxide 400 mg oral tablet: 1 tab(s) orally 3 times a day (with meals)  metoprolol tartrate 25 mg oral tablet: 1 tab(s) orally 2 times a day  Multiple Vitamins oral capsule: 1 cap(s) orally once a day  thiamine 100 mg oral tablet: 1 tab(s) orally once a day

## 2022-02-28 NOTE — CONSULT NOTE ADULT - SUBJECTIVE AND OBJECTIVE BOX
[facility]   [facility]   Office: [address] [place] [address]   Telephone: [phone]  Fax: [phone]                     Electrophysiology Consult Note                                                                                                                                          Consult requested by: Dr. Raines  Reason for Consultation: new onset AF   History obtained by: pt and EMR   obtained: no   Cardiologist: Santos Sorto MD     HPI:  61 year old male with PMH of HTN, gout, remote sarcoidosis of the lung, and ETOH abuse who was referred to [facility] ED from [facility] for 2 week hx of cough, wheezing, and dizziness. Pt states he has several month history of dizziness and shortness of breath, which has worsened over the past 2 weeks. He reports 3 prior episodes of syncope (last ~ 1 year ago) for which he did not seek medical attention. In the ED, found to have THOMAS and new onset AF. Denies prior cardiac history. Denies palpitation or chest pain, denies fever, denies any other complaints at this time. EP consult requested.     Telemetry: AF rates controlled 60-70s, few pauses < 3 seconds     PAST MEDICAL & SURGICAL HISTORY:  Hypertension  Gout   Sarcoidosis     HOME MEDICATIONS:   Allopurinol  Amlodipine     REVIEW OF SYSTEMS:  CONSTITUTIONAL: No fever, weight loss, or fatigue  EYES: No eye pain, visual disturbances, or discharge  ENMT:  No difficulty hearing, tinnitus, vertigo; No sinus or throat pain  NECK: No pain or stiffness  RESPIRATORY: + cough, + wheezing, Denies chills or hemoptysis; No shortness of breath  CARDIOVASCULAR: No chest pain, palpitations, + dizziness, or leg swelling  GASTROINTESTINAL: No abdominal or epigastric pain. No nausea, vomiting, or hematemesis; No diarrhea or constipation. No melena or hematochezia.  NEUROLOGICAL: No headaches, memory loss, loss of strength, numbness, or tremors  SKIN: No itching, burning, rashes, or lesions   MUSCULOSKELETAL: No joint pain or swelling; No muscle, back, or extremity pain  PSYCHIATRIC: No depression, anxiety, mood swings, or difficulty sleeping  HEME/LYMPH: No easy bruising, or bleeding gums  ALLERGY AND IMMUNOLOGIC: No hives or eczema    MEDICATIONS  (STANDING):  amLODIPine   Tablet 10 milliGRAM(s) Oral daily  folic acid 1 milliGRAM(s) Oral daily  heparin  Infusion. 1600 Unit(s)/Hr (16 mL/Hr) IV Continuous <Continuous>  metoprolol tartrate 25 milliGRAM(s) Oral two times a day  multivitamin 1 Tablet(s) Oral daily  thiamine 100 milliGRAM(s) Oral daily    MEDICATIONS  (PRN):  acetaminophen     Tablet .. 650 milliGRAM(s) Oral every 6 hours PRN Temp greater or equal to 38C (100.4F), Mild Pain (1 - 3)  aluminum hydroxide/magnesium hydroxide/simethicone Suspension 30 milliLiter(s) Oral every 4 hours PRN Dyspepsia  heparin   Injectable 9000 Unit(s) IV Push every 6 hours PRN For aPTT less than 40  heparin   Injectable 4000 Unit(s) IV Push every 6 hours PRN For aPTT between 40 - 57  melatonin 3 milliGRAM(s) Oral at bedtime PRN Insomnia  ondansetron Injectable 4 milliGRAM(s) IV Push every 8 hours PRN Nausea and/or Vomiting    Allergies:  No Known Allergies    SOCIAL HISTORY: currently residing at inpatient detox facility, lives w/siblings otherwise   Quit smoking 30 years ago, last ETOH ~ 2 weeks ago, admits to hx of frequent ETOH and binge drinking, unemployed/disabled     FAMILY HISTORY: Denies family history of cardiac disease     Vital Signs Last 24 Hrs  T(C): 36.6 (28 Feb 2022 04:34), Max: 36.6 (28 Feb 2022 04:34)  T(F): 97.9 (28 Feb 2022 04:34), Max: 97.9 (28 Feb 2022 04:34)  HR: 71 (28 Feb 2022 04:34) (70 - 84)  BP: 136/80 (28 Feb 2022 04:34) (106/66 - 136/80)  RR: 18 (28 Feb 2022 04:34) (18 - 18)  SpO2: 92% (28 Feb 2022 04:34) (92% - 98%)    Physical Exam:  Constitutional: AAOx3, NAD  Neck: supple, No JVD  Cardiovascular: +S1S2 RRR, no murmurs, rubs, gallops   Pulmonary: CTA b/l, unlabored, no wheezes, rales, rhonchi  Abdomen: +BS, soft NTND  Extremities: no edema b/l, +distal pulses b/l  Neuro: non focal, speech clear, PELLETIER x 4    LABS:                        10.7   6.75  )-----------( 403      ( 28 Feb 2022 06:57 )             34.2   02-28    136  |  103  |  31.0<H>  ----------------------------<  99  4.6   |  23.0  |  1.37<H>    Ca    9.1      28 Feb 2022 06:57    TPro  6.5<L>  /  Alb  3.0<L>  /  TBili  0.2<L>  /  DBili  x   /  AST  64<H>  /  ALT  99<H>  /  AlkPhos  76  02-28  LIVER FUNCTIONS - ( 28 Feb 2022 06:57 )  Alb: 3.0 g/dL / Pro: 6.5 g/dL / ALK PHOS: 76 U/L / ALT: 99 U/L / AST: 64 U/L / GGT: x           PTT - ( 28 Feb 2022 06:57 )  PTT:43.2 sec    RADIOLOGY & ADDITIONAL STUDIES:  TTE: 2/27/22:  Summary:   1. Normal left ventricular internal cavity size.   2. Normal global left ventricular systolic function.   3. Left ventricular ejection fraction, by visual estimation, is 65 to 70%.   4. The mitral in-flow pattern reveals no discernable A-wave, therefore no comment on diastolic function can be made.   5. Normal right ventricular size and function.   6. The left atrium is normal in size.   7. The right atrium is normal in size.   8. Mild mitral annular calcification.   9. Mild mitral valve regurgitation.  10. Mild thickening of the anterior and posterior mitral valve leaflets.  11. Normal trileaflet aortic valve with normal opening.  12. There is no evidence of pericardial effusion.  Antonio Angel MD Electronically signed on 2/27/2022 at 2:35:11 PM    CXR: 2/25/22:   IMPRESSION: No acute finding or change.  --- End of Report ---  JUAN STACK MD; Attending Radiologist  This document has been electronically signed. Feb 25 2022  3:45PM    Nuclear stress test: 2/28/22:   NUCLEAR FINDINGS:  The left ventricle was normal in size. Normal myocardial  perfusion scan, with no evidence of infarction or  inducible ischemia.  ------------------------------------------------------------------------  GATED ANALYSIS:  Gated wall motion analysis is performed, and shows normal  wall motion with post stress LVEF of 74%.  ------------------------------------------------------------------------  LV/RV OBSERVATION:  Normal LV ejection fraction.  ------------------------------------------------------------------------  IMPRESSIONS: Normal Study  * The left ventricle was normal in size.  * Tracer uptake was homogeneous throughout the left ventricle.  * Normal study; no evidence for myocardial infarction or ischemia.  * Gated wall motion analysis was performed, and shows normal wall motion.  ------------------------------------------------------------------------  -----------------------------------------------------------------------  Confirmed on  2/28/2022 - 12:57:02 at [facility] by Haider Raines MD     Cardiology Fellow: Dacia Weinstein

## 2022-02-28 NOTE — DISCHARGE NOTE PROVIDER - ATTENDING DISCHARGE PHYSICAL EXAMINATION:
Vital Signs  T(C): 36.9 (01 Mar 2022 12:15), Max: 36.9 (01 Mar 2022 11:38)  T(F): 98.5 (01 Mar 2022 12:15), Max: 98.5 (01 Mar 2022 12:15)  HR: 73 (01 Mar 2022 12:15) (73 - 96)  BP: 147/88 (01 Mar 2022 12:15) (110/72 - 147/88)  RR: 16 (01 Mar 2022 12:15) (16 - 18)  SpO2: 98% (01 Mar 2022 12:15) (96% - 98%)  General: Elderly male sitting in bed comfortably. No acute distress  HEENT: EOMI. Clear conjunctivae. Moist mucus membrane  Neck: Supple.   Chest: CTA bilaterally. No wheezing, rales or rhonchi. No chest wall tenderness.  Heart: S1 & S2 with irregular rhythm (at the time of exam prior to ADY/DCCV).  Abdomen: Soft. Non-tender. Non-distended. + BS  Ext: No pedal edema. No calf tenderness   Neuro: AAO x 3. No focal deficit. No speech disorder. No tremors.   Skin: Warm and Dry  Psychiatry: Normal mood and affect

## 2022-02-28 NOTE — PROGRESS NOTE ADULT - SUBJECTIVE AND OBJECTIVE BOX
[facility]                                                         CARDIOLOGY AT [facility]                                                                  [address], [place] [address]                                                         Telephone: [phone]. Fax:[phone]                                                                             PROGRESS NOTE    Reason for follow up: follow up stress test, afib   Update: stress test complete today       Review of symptoms:   Cardiac:  No chest pain. No dyspnea. No palpitations.  Respiratory: no cough. No dyspnea  Gastrointestinal: No diarrhea. No abdominal pain. No bleeding.   Neuro: No focal neuro complaints.    Vitals:  T(C): 36.7 (02-28-22 @ 14:35), Max: 36.7 (02-28-22 @ 14:35)  HR: 91 (02-28-22 @ 14:35) (70 - 91)  BP: 135/69 (02-28-22 @ 14:35) (106/66 - 136/80)  RR: 18 (02-28-22 @ 14:35) (18 - 18)  SpO2: 97% (02-28-22 @ 14:35) (92% - 98%)  Wt(kg): --  I&O's Summary    Weight (kg): 105.4 (02-25 @ 21:25)    PHYSICAL EXAM:  Appearance: Comfortable. No acute distress  HEENT:  Atraumatic. Normocephalic.  Normal oral mucosa  Neurologic: A & O x 3, no gross focal deficits.  Cardiovascular: RRR S1 S2, No murmur, no rubs/gallops. No JVD  Respiratory: Lungs clear to auscultation, unlabored   Gastrointestinal:  Soft, Non-tender, + BS  Lower Extremities: 2+ Peripheral Pulses, No clubbing, cyanosis, or edema  Psychiatry: Patient is calm. No agitation.   Skin: warm and dry.    CURRENT CARDIAC MEDICATIONS:  amLODIPine   Tablet 10 milliGRAM(s) Oral daily  metoprolol tartrate 25 milliGRAM(s) Oral two times a day      CURRENT OTHER MEDICATIONS:  acetaminophen     Tablet .. 650 milliGRAM(s) Oral every 6 hours PRN Temp greater or equal to 38C (100.4F), Mild Pain (1 - 3)  melatonin 3 milliGRAM(s) Oral at bedtime PRN Insomnia  ondansetron Injectable 4 milliGRAM(s) IV Push every 8 hours PRN Nausea and/or Vomiting  aluminum hydroxide/magnesium hydroxide/simethicone Suspension 30 milliLiter(s) Oral every 4 hours PRN Dyspepsia  folic acid 1 milliGRAM(s) Oral daily  heparin   Injectable 9000 Unit(s) IV Push every 6 hours PRN For aPTT less than 40  heparin   Injectable 4000 Unit(s) IV Push every 6 hours PRN For aPTT between 40 - 57  heparin  Infusion. 1600 Unit(s)/Hr (16 mL/Hr) IV Continuous <Continuous>  multivitamin 1 Tablet(s) Oral daily  thiamine 100 milliGRAM(s) Oral daily      LABS:	 	  ( 26 Feb 2022 07:09 )  Troponin T  <0.01,  CPK  41   , CKMB  X    , BNP X        , ( 25 Feb 2022 13:35 )  Troponin T  <0.01,  CPK  X    , CKMB  X    , <H>                              10.7   6.75  )-----------( 403      ( 28 Feb 2022 06:57 )             34.2     02-28    136  |  103  |  31.0<H>  ----------------------------<  99  4.6   |  23.0  |  1.37<H>    Ca    9.1      28 Feb 2022 06:57    TPro  6.5<L>  /  Alb  3.0<L>  /  TBili  0.2<L>  /  DBili  x   /  AST  64<H>  /  ALT  99<H>  /  AlkPhos  76  02-28    PT/INR/PTT ( 28 Feb 2022 14:05 )                       :                       :      X            :       98.8                  .        .                   .              .           .       X           .                                       Lipid Profile:   HgA1c:   TSH: Thyroid Stimulating Hormone, Serum: 1.59 uIU/mL      TELEMETRY: Afib   ECG:    DIAGNOSTIC TESTING:  [ ] Echocardiogram:   [ ]  Catheterization:  [x ] Stress Test:    < from: Nuclear Stress Test-Pharmacologic (02.28.22 @ 10:13) >  IMPRESSIONS:Normal Study  * The left ventricle was normal in size.  * Tracer uptake was homogeneous throughout the left  ventricle.  * Normal study; no evidence for myocardial infarction or  ischemia.  * Gated wall motion analysis was performed, and shows  normal wall motion.    < end of copied text >    OTHER:

## 2022-02-28 NOTE — PROGRESS NOTE ADULT - ATTENDING COMMENTS
60yo M with PMHX HTN, chronic ETOH use sent from [facility] on Ativan taper for dyspnea, dizziness and admitted w/ new onset Atrial Fibrillation w/ RVR and acute HF exacerbation, hemodynamically stable and no acute ECG changes.   Patient has a history of Sarcoid of lung many years ago and he was treated with Steroids.    Patient reports being short of breath walking few feet like from his room in the hospital to the entrance of the floor.    Exam:  Chest- Bilateral clear BS  Cardiac - S1 and S2    EKG- Afib with NSTT, rate controlled on BB  ECHO results noted  Trops are negative.  D dimer is in the normal range  TSH is normal.  Patient denies DM.  Nuclear Stress Test is normal.    Recommendations:  Afib management as per EP  I concur with NP's note    Will sign off.
62yo M with PMHX HTN, chronic ETOH use sent from [facility] on Ativan taper for dyspnea, dizziness and admitted w/ new onset Atrial Fibrillation w/ RVR and acute HF exacerbation, hemodynamically stable and no acute ECG changes.   Patient has a history of Sarcoid of lung many years ago and he was treated with Steroids.    Patient reports being short of breath walking few feet like from his room in the hospital to the entrance of the floor.    Exam:  Chest- Bilateral clear BS  Cardiac - S1 and S2    EKG- Afib with NSTT, rate controlled on BB  ECHO results noted  Trops are negative.  D dimer is in the normal range  TSH is normal.  Patient denies DM.  Recommendations:  1. For nuclear stress test in am.  2. Continue IV Heparin and BB    Will follow

## 2022-02-28 NOTE — PROGRESS NOTE ADULT - SUBJECTIVE AND OBJECTIVE BOX
History of other medical treatment    HPI:  61M with PMHX HTN, Gout, ETOH Abuse sent from [facility] on Ativan taper for Cough/SOB/Dizziness. Patient found to have ARF and Volume Overload on CXR. EKG with new onset AFIB and Telemetry showing persistent AFIB RVR 110s. Pt seen/examined. Med list confirmed. Currently on ativan taper for acute etoh wd syndrome at facility. CIWA controlled at time of admission. Patient has been at [facility] for 1-2 days only. Recent ETOH binge drinking. No prior hx arrhythmia. ROS Negative unless mentioned.    PMHX: HTN, Gout, ETOH Abuse  PSHX: None  FamHx: no fam hx HTN  Social Hx: +Former Smoker 30 years ago. +ETOH Abuse last drink 2 days ago.  (25 Feb 2022 21:00)    Interval History:  Patient was seen and examined at bedside around 2 pm.  Anxious about medical bills and being in the hospital.   Denies chest pain, palpitations, shortness of breath, headache, dizziness, visual symptoms, nausea, vomiting or abdominal pain.  Denies hallucinations or paresthesia.     ROS:  As per interval history otherwise unremarkable.    PHYSICAL EXAM:  Vital Signs  T(C): 36.7 (28 Feb 2022 14:35), Max: 36.7 (28 Feb 2022 14:35)  T(F): 98 (28 Feb 2022 14:35), Max: 98 (28 Feb 2022 14:35)  HR: 91 (28 Feb 2022 14:35) (70 - 91)  BP: 135/69 (28 Feb 2022 14:35) (106/66 - 136/80)  RR: 18 (28 Feb 2022 14:35) (18 - 18)  SpO2: 97% (28 Feb 2022 14:35) (92% - 98%)  General: Elderly male sitting in bed comfortably. No acute distress  HEENT: EOMI. Clear conjunctivae. Moist mucus membrane  Neck: Supple.   Chest: CTA bilaterally. No wheezing, rales or rhonchi. No chest wall tenderness.  Heart: S1 & S2 with irregular rhythm.   Abdomen: Soft. Non-tender. Non-distended. + BS  Ext: No pedal edema. No calf tenderness   Neuro: AAO x 3. No focal deficit. No speech disorder. No tremors.   Skin: Warm and Dry  Psychiatry: Normal mood and affect    LABS:                        10.7   6.75  )-----------( 403      ( 28 Feb 2022 06:57 )             34.2     02-28    136  |  103  |  31.0<H>  ----------------------------<  99  4.6   |  23.0  |  1.37<H>    Ca    9.1      28 Feb 2022 06:57    TPro  6.5<L>  /  Alb  3.0<L>  /  TBili  0.2<L>  /  DBili  x   /  AST  64<H>  /  ALT  99<H>  /  AlkPhos  76  02-28    PTT - ( 28 Feb 2022 14:05 )  PTT:98.8 sec    RADIOLOGY & ADDITIONAL STUDIES:  Reviewed      TTE Echo Complete w/ Contrast w/ Doppler (02.27.22 @ 11:40)    1. Normal left ventricular internal cavity size.   2. Normal global left ventricular systolic function.   3. Left ventricular ejection fraction, by visual estimation, is 65 to 70%.   4. The mitral in-flow pattern reveals no discernable A-wave, therefore no comment on diastolic function can be made.   5. Normal right ventricular size and function.   6. The left atrium is normal in size.   7. The right atrium is normal in size.   8. Mild mitral annular calcification.   9. Mild mitral valve regurgitation.  10. Mild thickening of the anterior and posterior mitral valve leaflets.  11. Normal trileaflet aortic valve with normal opening.  12. There is no evidence of pericardial effusion.    MEDICATIONS  (STANDING):  amLODIPine   Tablet 10 milliGRAM(s) Oral daily  folic acid 1 milliGRAM(s) Oral daily  heparin  Infusion. 1600 Unit(s)/Hr (16 mL/Hr) IV Continuous <Continuous>  metoprolol tartrate 25 milliGRAM(s) Oral two times a day  multivitamin 1 Tablet(s) Oral daily  thiamine 100 milliGRAM(s) Oral daily    MEDICATIONS  (PRN):  acetaminophen     Tablet .. 650 milliGRAM(s) Oral every 6 hours PRN Temp greater or equal to 38C (100.4F), Mild Pain (1 - 3)  aluminum hydroxide/magnesium hydroxide/simethicone Suspension 30 milliLiter(s) Oral every 4 hours PRN Dyspepsia  heparin   Injectable 9000 Unit(s) IV Push every 6 hours PRN For aPTT less than 40  heparin   Injectable 4000 Unit(s) IV Push every 6 hours PRN For aPTT between 40 - 57  melatonin 3 milliGRAM(s) Oral at bedtime PRN Insomnia  ondansetron Injectable 4 milliGRAM(s) IV Push every 8 hours PRN Nausea and/or Vomiting

## 2022-02-28 NOTE — DISCHARGE NOTE PROVIDER - NSDCCPCAREPLAN_GEN_ALL_CORE_FT
PRINCIPAL DISCHARGE DIAGNOSIS  Diagnosis: New onset atrial fibrillation  Assessment and Plan of Treatment: please continue metoprolol 25mg twice a day      SECONDARY DISCHARGE DIAGNOSES  Diagnosis: Alcohol use with intoxication  Assessment and Plan of Treatment: completed your detox and medication taper.     PRINCIPAL DISCHARGE DIAGNOSIS  Diagnosis: New onset atrial fibrillation  Assessment and Plan of Treatment: s/p ADY/DCCV.   Please continue medications as prescribed.  Follow up with EP in 1-2 weeks.      SECONDARY DISCHARGE DIAGNOSES  Diagnosis: Alcoholism  Assessment and Plan of Treatment: Counselled to stop alcohol.  Follow up with [facility] after discharge.

## 2022-02-28 NOTE — SBIRT NOTE ADULT - NSSBIRTALCPASSREFTXDET_GEN_A_CORE
Patient report that he has 1 day left at [facility] located in [place], [place]. He report that he wishes to return to [facility] post discharge from the hospital. He declined intervention of JERAMIE. He plans to discuss follow up appointments with [facility].

## 2022-02-28 NOTE — PROGRESS NOTE ADULT - ASSESSMENT
61 years old male with history of Alcoholism, HTN, CKD III and Gout sent from [facility] with cough, shortness of breath and dizziness.     1) New Onset A. Fib  - ECHO as above   - NST negative   - Plan for ADY/DCCV in am   - Continue Metoprolol   - Continue Heparin Infusion  - EP Consult / Cardio follow up noted     2) Alcoholism  - No signs of withdrawal at this time   - Librium taper was discontinued on 2/26/22  - Continue Thiamine for suspected deficiency, MVI and Folic Acid     3) CKD III  - Stable   - Avoid nephrotoxic medications   - Monitor renal function     4) Elevated LFTs  - Likely due to alcoholism  - Monitor     5) HTN  - Continue Amlodipine and Metoprolol     DVT Prophylaxis -- Patient is on Heparin Infusion     Dispo: [facility] in 24 hours.   
61M with PMHX HTN, Gout, ETOH Abuse sent from [facility] on Ativan taper for Cough/SOB/Dizziness admitted for Acute CHF Exacerbation 2/2 New Onset AFIB RVR and ARF and ETOH WD Syndrome.     ETOH Abuse c/b ETOH WD Syndrome  CIWA - 0 currently, Discontinued taper   COVID/RVP Negative    New Onset AFIB  with RVR on admission   Probably uncontrolled AFIB induced CHF   CXR +BL Pulm Vasc Congestion  C/w Metoprolol Tartrate 25mg PO BID  Heparin infusion for AC for now   F/u TTE  Cardiology Consult appreciated, may need Nuclear stress test     CKD stage III   stable  US kidney: No hydronephrosis. Increased renal cortical echogenicity bilaterally as noted previously, which may be secondary to medical renal disease.  Hold Allopurinol and other Nephrotoxins    NAGMA  Avoid further IVF. Trend BMP    Gout  Hold Allopurinol for now     DVT ppx: Heparin gtt     Dispo: Acute, likely back to DETOX after admission  
62yo M with PMHx MS, HTN, chronic ETOH use, tobacco sent from [facility] on Ativan taper for cough, dyspnea and dizziness.  In the EDU patient found to have new EKG changes c/w Atrial-Fibrillation and on Tele persistent A-Fib w/ RVR 110s.         
ASSESSMENT:  61M with PMHX HTN, Gout, ETOH Abuse sent from [facility] on Ativan taper for Cough/SOB/Dizziness admitted for Acute CHF Exacerbation 2/2 New Onset AFIB RVR and ARF and ETOH WD Syndrome.     PLAN:  ETOH Abuse c/b ETOH WD Syndrome  - Given CIWA- 0. Discontinued taper.   -Likely back to DETOX after admission  -COVID/RVP Negative    New Onset AFIB RVR  -Probably uncontrolled AFIB induced CHF   -CXR +BL Pulm Vasc Congestion  -C/w Metoprolol Tartrate 25mg PO BID  -Heparin infusion for AC for now. Adjusted from 19 --> 16  -F/u TTE  -Cardiology Consult ([place])- will hold off on diuretics    ARF- stable  -Hold Allopurinol and other Nephrotoxins  -F/u  kidney  -Likely decreased renal perfusion from AFIB    NAGMA  -Avoid further IVF. Trend BMP    Gout  -Hold Allopurinol with ARF     Patient deferred updating family members.
62 yo M with PMHx MS, HTN, chronic ETOH use, tobacco sent from [facility] on Ativan taper for cough, dyspnea and dizziness.  In the EDU patient found to have new EKG changes c/w Atrial-Fibrillation and on Tele persistent A-Fib w/ RVR 110s.

## 2022-02-28 NOTE — CONSULT NOTE ADULT - ATTENDING COMMENTS
New onset atrial fibrillation. Unclear timing of onset. Patient's symptoms appear to have been of several weeks duration suggesting longer duration of AF. Recommend ADY/cardioversion tomorrow followed by initiation of antiarrhythmic therapy (flecainide). Switch to oral AC for stroke prophylaxis.
60yo M with PMHX HTN, chronic ETOH use sent from [facility] on Ativan taper for dyspnea, dizziness and admitted w/ new onset Atrial Fibrillation w/ RVR and acute HF exacerbation, hemodynamically stable and no acute ECG changes.   Patient has a history of Sarcoid of lung many years ago and he was treated with Steroids.    Patient reports being short of breath walking few feet like from his room in the hospital to the entrance of the floor.    Exam:  Chest- Bilateral clear BS  Cardiac - S1 and S2    EKG- Afib with NSTT, rate controlled on BB    Trops are negative.  D dimer is in the normal range  TSH is normal.  Patient denies DM.  Recommendations:  1. Will follow echo  2. Will decide about nuclear stress test after checking his echo  3. Continue IV Heparin and BB    Will follow

## 2022-02-28 NOTE — CONSULT NOTE ADULT - REASON FOR ADMISSION
Acute CHF Exacerbation 2/2 New Onset AFIB RVR and ARF
Acute CHF Exacerbation 2/2 New Onset AFIB RVR and ARF

## 2022-02-28 NOTE — DISCHARGE NOTE PROVIDER - HOSPITAL COURSE
61 year-old male with history of HTN, Gout, ETOH Abuse was sent from [facility] to [facility] ED for Cough/SOB/Dizziness. In the ED, found to have THOMSA, CXR with volume overload, EKG with new onset Afib. Patient admitted for Acute CHF exacerbation 2/2 New Onset AFIB RVR, THOMAS and ETOH withdrawal. Patient was monitor on our CIWA protocol and completed BZD taper. Our Cardiology team was consulted for new onset Afib, patient started on metoprolol 25mg PO BID and heparin drip. TTE LVEF 65-70%, no valvular abnormalities. Pt underwent NST, which showed ___.      61 year-old male with history of HTN, Gout, ETOH Abuse was sent from [facility] to [facility] ED for Cough/SOB/Dizziness. In the ED, found to have THOMAS, CXR with volume overload, EKG with new onset Afib. Patient admitted for Acute CHF exacerbation 2/2 New Onset AFIB RVR, THOMAS and ETOH withdrawal. Patient was monitor on our CIWA protocol and completed BZD taper. Our Cardiology team was consulted for new onset Afib, patient started on metoprolol 25mg PO BID and heparin drip. TTE LVEF 65-70%, no valvular abnormalities. Pt underwent NST, which was normal. 3/1 patient to undergo ADY/DCCV 61 years old male with history of Alcoholism, HTN, CKD III and Gout sent from [facility] with cough, shortness of breath and dizziness. Admitted with New Onset A. Fib, started on Metoprolol and Heparin Infusion. Seen by Cardiology. TTE with normal EF and no significant valvulopathy. NST with no ischemia. Seen by EP who recommended ADY/DCCV which was done on 3/1/22. Postprocedure he was monitored closely. EP added Flecainide. Heparin Infusion was transitioned to Eliquis.   He was initially thought to have Acute Heart Failure however on further evaluation it was ruled out. He has baseline CKD III. No ARF.   During hospitalization, he was monitored closely. Initially started on Librium Taper and CIWA Protocol - Ativan which were later discontinued as he did not have any signs and symptoms of withdrawal. His symptoms have improved and he is feeling much better. He is stable for discharge.

## 2022-02-28 NOTE — DISCHARGE NOTE PROVIDER - CARE PROVIDER_API CALL
cas ann  Phone: (   )    -  Fax: (   )    -  Follow Up Time:    Hermelindo Thurman)  Cardiac Electrophysiology; Cardiovascular Disease; Internal Medicine  [address] [place] [address]  Phone: [phone]  Fax: [phone]  Follow Up Time: 2 weeks    Santos Sorto)  Cardiovascular Disease; Internal Medicine; Interventional Cardiology  [facility] [place] [address]  Phone: [phone]  Fax: [phone]  Established Patient  Follow Up Time: Routine    Mary Baez  Internal Medicine  [address] [place] [address], [address]  [place] [address]  Phone: [phone]  Fax: [phone]  Established Patient  Follow Up Time: 1 week

## 2022-03-01 ENCOUNTER — TRANSCRIPTION ENCOUNTER (OUTPATIENT)
Age: 62
End: 2022-03-01

## 2022-03-01 VITALS
TEMPERATURE: 98 F | HEART RATE: 73 BPM | RESPIRATION RATE: 16 BRPM | OXYGEN SATURATION: 98 % | DIASTOLIC BLOOD PRESSURE: 88 MMHG | SYSTOLIC BLOOD PRESSURE: 147 MMHG

## 2022-03-01 LAB
ALBUMIN SERPL ELPH-MCNC: 3.2 G/DL — LOW (ref 3.3–5.2)
ALP SERPL-CCNC: 75 U/L — SIGNIFICANT CHANGE UP (ref 40–120)
ALT FLD-CCNC: 69 U/L — HIGH
ANION GAP SERPL CALC-SCNC: 12 MMOL/L — SIGNIFICANT CHANGE UP (ref 5–17)
AST SERPL-CCNC: 33 U/L — SIGNIFICANT CHANGE UP
BASOPHILS # BLD AUTO: 0.04 K/UL — SIGNIFICANT CHANGE UP (ref 0–0.2)
BASOPHILS NFR BLD AUTO: 0.8 % — SIGNIFICANT CHANGE UP (ref 0–2)
BILIRUB SERPL-MCNC: 0.2 MG/DL — LOW (ref 0.4–2)
BUN SERPL-MCNC: 29.6 MG/DL — HIGH (ref 8–20)
CALCIUM SERPL-MCNC: 9.3 MG/DL — SIGNIFICANT CHANGE UP (ref 8.6–10.2)
CHLORIDE SERPL-SCNC: 107 MMOL/L — SIGNIFICANT CHANGE UP (ref 98–107)
CO2 SERPL-SCNC: 22 MMOL/L — SIGNIFICANT CHANGE UP (ref 22–29)
CREAT SERPL-MCNC: 1.42 MG/DL — HIGH (ref 0.5–1.3)
EGFR: 56 ML/MIN/1.73M2 — LOW
EOSINOPHIL # BLD AUTO: 0.12 K/UL — SIGNIFICANT CHANGE UP (ref 0–0.5)
EOSINOPHIL NFR BLD AUTO: 2.3 % — SIGNIFICANT CHANGE UP (ref 0–6)
GLUCOSE SERPL-MCNC: 111 MG/DL — HIGH (ref 70–99)
HCT VFR BLD CALC: 33.8 % — LOW (ref 39–50)
HGB BLD-MCNC: 10.8 G/DL — LOW (ref 13–17)
IMM GRANULOCYTES NFR BLD AUTO: 1.6 % — HIGH (ref 0–1.5)
LYMPHOCYTES # BLD AUTO: 1.36 K/UL — SIGNIFICANT CHANGE UP (ref 1–3.3)
LYMPHOCYTES # BLD AUTO: 26.5 % — SIGNIFICANT CHANGE UP (ref 13–44)
MAGNESIUM SERPL-MCNC: 1.7 MG/DL — SIGNIFICANT CHANGE UP (ref 1.6–2.6)
MCHC RBC-ENTMCNC: 32 GM/DL — SIGNIFICANT CHANGE UP (ref 32–36)
MCHC RBC-ENTMCNC: 32.1 PG — SIGNIFICANT CHANGE UP (ref 27–34)
MCV RBC AUTO: 100.6 FL — HIGH (ref 80–100)
MONOCYTES # BLD AUTO: 0.42 K/UL — SIGNIFICANT CHANGE UP (ref 0–0.9)
MONOCYTES NFR BLD AUTO: 8.2 % — SIGNIFICANT CHANGE UP (ref 2–14)
NEUTROPHILS # BLD AUTO: 3.11 K/UL — SIGNIFICANT CHANGE UP (ref 1.8–7.4)
NEUTROPHILS NFR BLD AUTO: 60.6 % — SIGNIFICANT CHANGE UP (ref 43–77)
PLATELET # BLD AUTO: 384 K/UL — SIGNIFICANT CHANGE UP (ref 150–400)
POTASSIUM SERPL-MCNC: 4.6 MMOL/L — SIGNIFICANT CHANGE UP (ref 3.5–5.3)
POTASSIUM SERPL-SCNC: 4.6 MMOL/L — SIGNIFICANT CHANGE UP (ref 3.5–5.3)
PROT SERPL-MCNC: 6.2 G/DL — LOW (ref 6.6–8.7)
RBC # BLD: 3.36 M/UL — LOW (ref 4.2–5.8)
RBC # FLD: 13.8 % — SIGNIFICANT CHANGE UP (ref 10.3–14.5)
SODIUM SERPL-SCNC: 141 MMOL/L — SIGNIFICANT CHANGE UP (ref 135–145)
WBC # BLD: 5.13 K/UL — SIGNIFICANT CHANGE UP (ref 3.8–10.5)
WBC # FLD AUTO: 5.13 K/UL — SIGNIFICANT CHANGE UP (ref 3.8–10.5)

## 2022-03-01 PROCEDURE — 82550 ASSAY OF CK (CPK): CPT

## 2022-03-01 PROCEDURE — 93320 DOPPLER ECHO COMPLETE: CPT

## 2022-03-01 PROCEDURE — 86803 HEPATITIS C AB TEST: CPT

## 2022-03-01 PROCEDURE — 78452 HT MUSCLE IMAGE SPECT MULT: CPT

## 2022-03-01 PROCEDURE — 76775 US EXAM ABDO BACK WALL LIM: CPT

## 2022-03-01 PROCEDURE — 85379 FIBRIN DEGRADATION QUANT: CPT

## 2022-03-01 PROCEDURE — C8929: CPT

## 2022-03-01 PROCEDURE — 92960 CARDIOVERSION ELECTRIC EXT: CPT

## 2022-03-01 PROCEDURE — 83880 ASSAY OF NATRIURETIC PEPTIDE: CPT

## 2022-03-01 PROCEDURE — 93312 ECHO TRANSESOPHAGEAL: CPT

## 2022-03-01 PROCEDURE — 82272 OCCULT BLD FECES 1-3 TESTS: CPT

## 2022-03-01 PROCEDURE — 83735 ASSAY OF MAGNESIUM: CPT

## 2022-03-01 PROCEDURE — 85730 THROMBOPLASTIN TIME PARTIAL: CPT

## 2022-03-01 PROCEDURE — 99285 EMERGENCY DEPT VISIT HI MDM: CPT | Mod: 25

## 2022-03-01 PROCEDURE — 96374 THER/PROPH/DIAG INJ IV PUSH: CPT

## 2022-03-01 PROCEDURE — A9500: CPT

## 2022-03-01 PROCEDURE — 36415 COLL VENOUS BLD VENIPUNCTURE: CPT

## 2022-03-01 PROCEDURE — 94640 AIRWAY INHALATION TREATMENT: CPT

## 2022-03-01 PROCEDURE — 93010 ELECTROCARDIOGRAM REPORT: CPT

## 2022-03-01 PROCEDURE — 93005 ELECTROCARDIOGRAM TRACING: CPT

## 2022-03-01 PROCEDURE — 93312 ECHO TRANSESOPHAGEAL: CPT | Mod: 26

## 2022-03-01 PROCEDURE — 85025 COMPLETE CBC W/AUTO DIFF WBC: CPT

## 2022-03-01 PROCEDURE — 71045 X-RAY EXAM CHEST 1 VIEW: CPT

## 2022-03-01 PROCEDURE — 85610 PROTHROMBIN TIME: CPT

## 2022-03-01 PROCEDURE — 85027 COMPLETE CBC AUTOMATED: CPT

## 2022-03-01 PROCEDURE — 80053 COMPREHEN METABOLIC PANEL: CPT

## 2022-03-01 PROCEDURE — 93017 CV STRESS TEST TRACING ONLY: CPT

## 2022-03-01 PROCEDURE — 84443 ASSAY THYROID STIM HORMONE: CPT

## 2022-03-01 PROCEDURE — 84100 ASSAY OF PHOSPHORUS: CPT

## 2022-03-01 PROCEDURE — 93325 DOPPLER ECHO COLOR FLOW MAPG: CPT

## 2022-03-01 PROCEDURE — 93325 DOPPLER ECHO COLOR FLOW MAPG: CPT | Mod: 26

## 2022-03-01 PROCEDURE — 84484 ASSAY OF TROPONIN QUANT: CPT

## 2022-03-01 PROCEDURE — 0225U NFCT DS DNA&RNA 21 SARSCOV2: CPT

## 2022-03-01 PROCEDURE — 93320 DOPPLER ECHO COMPLETE: CPT | Mod: 26

## 2022-03-01 PROCEDURE — 99239 HOSP IP/OBS DSCHRG MGMT >30: CPT | Mod: GC

## 2022-03-01 RX ORDER — APIXABAN 2.5 MG/1
1 TABLET, FILM COATED ORAL
Qty: 60 | Refills: 0
Start: 2022-03-01 | End: 2022-03-30

## 2022-03-01 RX ORDER — MAGNESIUM OXIDE 400 MG ORAL TABLET 241.3 MG
1 TABLET ORAL
Qty: 6 | Refills: 0
Start: 2022-03-01 | End: 2022-03-02

## 2022-03-01 RX ORDER — MAGNESIUM OXIDE 400 MG ORAL TABLET 241.3 MG
400 TABLET ORAL
Refills: 0 | Status: DISCONTINUED | OUTPATIENT
Start: 2022-03-01 | End: 2022-03-01

## 2022-03-01 RX ORDER — FLECAINIDE ACETATE 50 MG
1 TABLET ORAL
Qty: 60 | Refills: 0
Start: 2022-03-01 | End: 2022-03-30

## 2022-03-01 RX ORDER — FLECAINIDE ACETATE 50 MG
100 TABLET ORAL EVERY 12 HOURS
Refills: 0 | Status: DISCONTINUED | OUTPATIENT
Start: 2022-03-01 | End: 2022-03-01

## 2022-03-01 RX ORDER — METOPROLOL TARTRATE 50 MG
1 TABLET ORAL
Qty: 60 | Refills: 0
Start: 2022-03-01 | End: 2022-03-30

## 2022-03-01 RX ORDER — MAGNESIUM OXIDE 400 MG ORAL TABLET 241.3 MG
1 TABLET ORAL
Qty: 0 | Refills: 0 | DISCHARGE
Start: 2022-03-01 | End: 2022-03-02

## 2022-03-01 RX ADMIN — Medication 100 MILLIGRAM(S): at 11:49

## 2022-03-01 RX ADMIN — MAGNESIUM OXIDE 400 MG ORAL TABLET 400 MILLIGRAM(S): 241.3 TABLET ORAL at 18:04

## 2022-03-01 RX ADMIN — APIXABAN 5 MILLIGRAM(S): 2.5 TABLET, FILM COATED ORAL at 05:47

## 2022-03-01 RX ADMIN — AMLODIPINE BESYLATE 10 MILLIGRAM(S): 2.5 TABLET ORAL at 05:47

## 2022-03-01 RX ADMIN — MAGNESIUM OXIDE 400 MG ORAL TABLET 400 MILLIGRAM(S): 241.3 TABLET ORAL at 08:54

## 2022-03-01 RX ADMIN — Medication 100 MILLIGRAM(S): at 18:04

## 2022-03-01 RX ADMIN — APIXABAN 5 MILLIGRAM(S): 2.5 TABLET, FILM COATED ORAL at 18:04

## 2022-03-01 RX ADMIN — Medication 25 MILLIGRAM(S): at 05:47

## 2022-03-01 RX ADMIN — MAGNESIUM OXIDE 400 MG ORAL TABLET 400 MILLIGRAM(S): 241.3 TABLET ORAL at 11:49

## 2022-03-01 RX ADMIN — Medication 1 TABLET(S): at 11:50

## 2022-03-01 RX ADMIN — Medication 25 MILLIGRAM(S): at 18:04

## 2022-03-01 RX ADMIN — Medication 100 MILLIGRAM(S): at 11:50

## 2022-03-01 RX ADMIN — Medication 1 MILLIGRAM(S): at 11:49

## 2022-03-01 NOTE — PROGRESS NOTE ADULT - REASON FOR ADMISSION
Acute CHF Exacerbation 2/2 New Onset AFIB RVR and ARF

## 2022-03-01 NOTE — DISCHARGE NOTE NURSING/CASE MANAGEMENT/SOCIAL WORK - PATIENT PORTAL LINK FT
You can access the FollowMyHealth Patient Portal offered by [facility] by registering at the following website: [email or web address]. By joining [employer]’s FollowMyHealth portal, you will also be able to view your health information using other applications (apps) compatible with our system.

## 2022-03-01 NOTE — DISCHARGE NOTE NURSING/CASE MANAGEMENT/SOCIAL WORK - NSDCPEFALRISK_GEN_ALL_CORE
For information on Fall & Injury Prevention, visit: [email or web address] OR  [email or web address] OR  https://www.cdc.gov/steadi/patient.html

## 2022-03-01 NOTE — PROGRESS NOTE ADULT - SUBJECTIVE AND OBJECTIVE BOX
Pt doing well s/p uncomplicated ADY & DCCV, 300J x 1 with restoration of sinus rhythm. Denies complaint post procedure.     PAST MEDICAL & SURGICAL HISTORY:  Hypertension  Hyperlipidemia  Gout    MEDICATIONS  (STANDING):  allopurinol 100 milliGRAM(s) Oral daily  amLODIPine   Tablet 10 milliGRAM(s) Oral daily  apixaban 5 milliGRAM(s) Oral every 12 hours  folic acid 1 milliGRAM(s) Oral daily  magnesium oxide 400 milliGRAM(s) Oral three times a day with meals  metoprolol tartrate 25 milliGRAM(s) Oral two times a day  multivitamin 1 Tablet(s) Oral daily  thiamine 100 milliGRAM(s) Oral daily    MEDICATIONS  (PRN):  acetaminophen     Tablet .. 650 milliGRAM(s) Oral every 6 hours PRN Temp greater or equal to 38C (100.4F), Mild Pain (1 - 3)  aluminum hydroxide/magnesium hydroxide/simethicone Suspension 30 milliLiter(s) Oral every 4 hours PRN Dyspepsia  melatonin 3 milliGRAM(s) Oral at bedtime PRN Insomnia  ondansetron Injectable 4 milliGRAM(s) IV Push every 8 hours PRN Nausea and/or Vomiting    Post-procedure VS: /80 HR 71 O2 sat 92% RR 16    awake, alert, no obvious distress   Skin: no erythema/edema/blistering at defib pad sites.   Card: S1/S2, RRR, no m/g/r  Resp: lungs CTA b/l  Abd: S/NT/ND  Ext: no edema, distal pulses intact    EKG:     Assessment:   61 year old male with PMH of HTN, gout, remote sarcoidosis of the lung, and ETOH abuse who was referred to [facility] ED from [facility] for 2 week hx of cough, wheezing, and dizziness. Found to be in AF, time of onset unknown. Now status post ADY negative for ARSEN thrombus and uncomplicated DCCV with restoration of sinus rhythm.     Plan:   Observation on telemetry per post op protocol.    Resume PO intake.   Ambulate w/ assist once fully awake & back to baseline mental status w/ VSS.  Continue Eliquis 5mg Q 12 hrs. Importance of strict compliance with anticoagulation regimen reinforced with pt.   Start flecainide 100mg BID   Continue metoprolol as tolerated.   Outpatient follow up with Dr. Thurman in 2-3 weeks, the office will call to schedule.   Dispo per primary team.

## 2022-03-02 ENCOUNTER — APPOINTMENT (OUTPATIENT)
Dept: CARDIOLOGY | Facility: CLINIC | Age: 62
End: 2022-03-02

## 2022-03-08 ENCOUNTER — NON-APPOINTMENT (OUTPATIENT)
Age: 62
End: 2022-03-08

## 2022-03-08 ENCOUNTER — APPOINTMENT (OUTPATIENT)
Dept: CARDIOLOGY | Facility: CLINIC | Age: 62
End: 2022-03-08
Payer: MEDICARE

## 2022-03-08 VITALS
DIASTOLIC BLOOD PRESSURE: 80 MMHG | HEART RATE: 66 BPM | BODY MASS INDEX: 31.52 KG/M2 | SYSTOLIC BLOOD PRESSURE: 122 MMHG | OXYGEN SATURATION: 100 % | WEIGHT: 226 LBS

## 2022-03-08 DIAGNOSIS — R06.2 WHEEZING: ICD-10-CM

## 2022-03-08 PROCEDURE — 93000 ELECTROCARDIOGRAM COMPLETE: CPT

## 2022-03-08 PROCEDURE — 99214 OFFICE O/P EST MOD 30 MIN: CPT

## 2022-03-08 RX ORDER — NAPROXEN 500 MG/1
TABLET, DELAYED RELEASE ORAL
Refills: 0 | Status: DISCONTINUED | COMMUNITY
End: 2022-03-08

## 2022-03-08 RX ORDER — MECLIZINE HYDROCHLORIDE 12.5 MG/1
12.5 TABLET ORAL 3 TIMES DAILY
Qty: 21 | Refills: 0 | Status: DISCONTINUED | COMMUNITY
Start: 2019-12-17 | End: 2022-03-08

## 2022-03-08 RX ORDER — GABAPENTIN 300 MG/1
300 CAPSULE ORAL
Qty: 90 | Refills: 0 | Status: DISCONTINUED | COMMUNITY
Start: 2019-02-01 | End: 2022-03-08

## 2022-03-08 NOTE — PHYSICAL EXAM
[Well Developed] : well developed [Well Nourished] : well nourished [No Acute Distress] : no acute distress [Normal Conjunctiva] : normal conjunctiva [No Carotid Bruit] : no carotid bruit [Normal S1, S2] : normal S1, S2 [No Murmur] : no murmur [Clear Lung Fields] : clear lung fields [Soft] : abdomen soft [Non Tender] : non-tender [No Edema] : no edema [de-identified] : Some abdominal protuberance [de-identified] : Rhythm regular [de-identified] : No rales rhonchi or wheezes

## 2022-03-08 NOTE — HISTORY OF PRESENT ILLNESS
[FreeTextEntry1] : Jacob is 61 years old with a history of gout, dormant sarcoidosis borderline diabetes and a history of some intermittent renal insufficiency.  I had done a noninvasive cardiac work-up in the past which my recollection was that it was unremarkable\par \par He recently has been drinking heavy and admitted himself to [facility] for detoxification.  He was noted to be in atrial fibrillation.  He underwent a ADY DC cardioversion to sinus rhythm and was placed on metoprolol 25 twice daily flecainide 100 twice daily and magnesium folic acid along with his allopurinol and of course started on Eliquis 5 twice daily\par \par He has now been on alpha alcohol for some time and feels well.\par \par Blood tests in the hospital\par Hemoglobin 10.6 hematocrit 32.5\par Calcium 9.68 potassium 3.93 BUN 30.5 creatinine 1.56 sodium 147 GFR 46\par Chest x-ray PA and lateral mild diffuse increased interstitial markings in both lung fields no active infiltrate mild uncoiling of the aorta you can see\par \par Presently he is feeling well without palpitations dizziness chest pain or shortness of breath.  He continues to abstain from alcohol.  He is in excellent spirits.  He denies palpitations edema orthopnea PND\par \par EKG today normal sinus rhythm RSR prime in V1 otherwise is within normal limits

## 2022-03-08 NOTE — REASON FOR VISIT
[FreeTextEntry1] : Jacob Carlos 61 years old previously seen by me over 2 years ago recently admitted for alcohol detoxification and noted to be in atrial fibrillation

## 2022-03-08 NOTE — ASSESSMENT
[FreeTextEntry1] : Josh Carlos is 61 years old with a prior history of borderline diabetes hypertension alcohol abuse history of intermittent renal insufficiency and gout.  During detoxification of his alcohol abuse he was noted to be in A. fib and underwent successful cardioversion.  He presently is in normal sinus rhythm and asymptomatic with a normal EKG.  He is on metoprolol flecainide and Eliquis\par \par 1.  Alcoholism seems to be under good control he now has abstained for over a month\par \par 2.  Paroxysmal atrial fibrillation patient presently is in normal sinus rhythm with a normal EKG\par \par 3.  No evidence of CHF or angina previous noninvasive work-up was negative several years ago\par \par 4 history of gout no recent episodes of gout has been on allopurinol\par \par 5.  Renal insufficiency last creatinine approximately 1.5 in the hospital.

## 2022-03-08 NOTE — DISCUSSION/SUMMARY
[FreeTextEntry1] : 1.  Patient will return to Dr. Baez for follow-up of his renal insufficiency and general medical care\par \par 2.  For now continue the flecainide 200 twice daily metoprolol 25 twice daily and Eliquis 5 twice daily\par \par 3.  Follow-up in 1 month and at that point I would's probably stop the flecainide and continue the other medications\par \par

## 2022-03-11 PROBLEM — Z00.00 ENCOUNTER FOR PREVENTIVE HEALTH EXAMINATION: Noted: 2022-03-11

## 2022-03-23 ENCOUNTER — APPOINTMENT (OUTPATIENT)
Dept: ELECTROPHYSIOLOGY | Facility: CLINIC | Age: 62
End: 2022-03-23
Payer: MEDICARE

## 2022-03-23 ENCOUNTER — NON-APPOINTMENT (OUTPATIENT)
Age: 62
End: 2022-03-23

## 2022-03-23 VITALS — DIASTOLIC BLOOD PRESSURE: 76 MMHG | SYSTOLIC BLOOD PRESSURE: 132 MMHG

## 2022-03-23 VITALS
HEIGHT: 71 IN | DIASTOLIC BLOOD PRESSURE: 76 MMHG | OXYGEN SATURATION: 98 % | BODY MASS INDEX: 31.78 KG/M2 | TEMPERATURE: 97.5 F | SYSTOLIC BLOOD PRESSURE: 134 MMHG | WEIGHT: 227 LBS | HEART RATE: 61 BPM

## 2022-03-23 DIAGNOSIS — Z86.79 PERSONAL HISTORY OF OTHER DISEASES OF THE CIRCULATORY SYSTEM: ICD-10-CM

## 2022-03-23 DIAGNOSIS — Z78.9 OTHER SPECIFIED HEALTH STATUS: ICD-10-CM

## 2022-03-23 DIAGNOSIS — Z87.891 PERSONAL HISTORY OF NICOTINE DEPENDENCE: ICD-10-CM

## 2022-03-23 DIAGNOSIS — Z87.39 PERSONAL HISTORY OF OTHER DISEASES OF THE MUSCULOSKELETAL SYSTEM AND CONNECTIVE TISSUE: ICD-10-CM

## 2022-03-23 DIAGNOSIS — Z82.3 FAMILY HISTORY OF STROKE: ICD-10-CM

## 2022-03-23 PROCEDURE — 93000 ELECTROCARDIOGRAM COMPLETE: CPT

## 2022-03-23 PROCEDURE — 99214 OFFICE O/P EST MOD 30 MIN: CPT

## 2022-03-23 RX ORDER — AMLODIPINE BESYLATE 5 MG/1
5 TABLET ORAL DAILY
Refills: 0 | Status: DISCONTINUED | COMMUNITY
End: 2022-03-23

## 2022-03-23 NOTE — HISTORY OF PRESENT ILLNESS
[FreeTextEntry1] : Mr. Carlos is a pleasant 61 year old male here for follow up today. The patient was recently seen in the hospital for symptomatic new onset atrial fibrillation. The patient also has a history of HTN, gout, remote sarcoidosis of the lung and alcohol abuse. He underwent ADY/DCCV for atrial fibrillation while in the hospital and was discharged home on eliquis, metoprolol and flecainide. TTE revealed normal LVEF and no significant valve disease, and a stress test was negative for ischemia or infarct. The patient was referred to the ED from [facility]. Prior to admission the patient reported a history of shortness of breath and dizziness of several months duration. He also has a history of syncope (last about one year ago). He was also found to have THOMAS at the time of admission. He is here follow up today. He reports to be doing well since discharge with resolution of symptoms of dizziness and shortness of breath. He has abstained from drinking alcohol for the last one month. He is tolerating his medications without any adverse effects.

## 2022-03-23 NOTE — CARDIOLOGY SUMMARY
[de-identified] : 3/23/22: Sinus rhythm at 55 bpm [de-identified] : Nuclear 2/28/22: no evidence of ischemia or infarction [de-identified] : 2/27/22: EF 65-70%, normal LA size, mild MR

## 2022-03-23 NOTE — DISCUSSION/SUMMARY
[FreeTextEntry1] : In summary, Mr. Carlos is a 61 year old male with a history of symptomatic atrial fibrillation, HTN and alcohol use. He is currently on flecainide (100 mg bid), eliquis and metoprolol. He has maintained sinus rhythm since the cardioversion procedure and reports to be feeling well. Based on the duration and timeline of his symptoms it is likely that he was in atrial fibrillation for several weeks before the procedure. It is likely that atrial fibrillation will recur in the absence of a rhythm control strategy. I discussed the various therapeutic options with the patient including medical therapy vs catheter ablation. The patient prefers to undergo an ablation procedure. We discussed the details of the procedure as well as potential complications related to it including but not limited to bleeding, hematoma, infection, damage to blood vessels, cardiac perforation, damage to the conduction system requiring pacemaker, MI, stroke and DVT. The patient expressed understanding and wishes to proceed. He will be scheduled for the procedure as soon as possible. He will hold eliquis on the day of the procedure.

## 2022-04-12 ENCOUNTER — APPOINTMENT (OUTPATIENT)
Dept: CARDIOLOGY | Facility: CLINIC | Age: 62
End: 2022-04-12
Payer: MEDICARE

## 2022-04-12 VITALS
SYSTOLIC BLOOD PRESSURE: 122 MMHG | HEART RATE: 67 BPM | DIASTOLIC BLOOD PRESSURE: 72 MMHG | BODY MASS INDEX: 32.34 KG/M2 | OXYGEN SATURATION: 97 % | WEIGHT: 231 LBS | HEIGHT: 71 IN

## 2022-04-12 DIAGNOSIS — I10 ESSENTIAL (PRIMARY) HYPERTENSION: ICD-10-CM

## 2022-04-12 PROCEDURE — 99214 OFFICE O/P EST MOD 30 MIN: CPT

## 2022-04-12 RX ORDER — OMEGA-3/DHA/EPA/FISH OIL 300-1000MG
400 CAPSULE ORAL
Refills: 0 | Status: ACTIVE | COMMUNITY

## 2022-04-12 NOTE — REASON FOR VISIT
[FreeTextEntry1] : Jacob Carlos 61 years old status post recent hospitalization for alcohol withdrawal and the development of atrial fibrillation here for follow-up of his cardiac status.  He recently developed a rash on his head.

## 2022-04-12 NOTE — PHYSICAL EXAM
[Well Developed] : well developed [Well Nourished] : well nourished [No Acute Distress] : no acute distress [Normal Conjunctiva] : normal conjunctiva [No Carotid Bruit] : no carotid bruit [Normal S1, S2] : normal S1, S2 [No Murmur] : no murmur [Clear Lung Fields] : clear lung fields [Soft] : abdomen soft [Non Tender] : non-tender [No Edema] : no edema [de-identified] : Some abdominal protuberance and has several reddish discolorations multiple. [de-identified] : Rhythm regular [de-identified] : No rales rhonchi or wheezes

## 2022-04-12 NOTE — ASSESSMENT
[FreeTextEntry1] : Josh Carlos is 61 years old with a prior history of borderline diabetes hypertension alcohol abuse history of intermittent renal insufficiency and gout.  During detoxification of his alcohol abuse he was noted to be in A. fib and underwent successful cardioversion.  He presently is in normal sinus rhythm and asymptomatic with a normal EKG.  He is on metoprolol flecainide and Eliquis\par \par 1.  Alcoholism seems to be under good control he now has abstained for over a month\par \par 2.  Paroxysmal atrial fibrillation patient presently is in normal sinus rhythm with a normal EKG.  Patient remains in normal sinus rhythm today\par \par 3.  No evidence of CHF or angina previous noninvasive work-up was negative several years ago\par \par 4 history of gout no recent episodes of gout has been on allopurinol\par \par 5.  Renal insufficiency last creatinine approximately 1.5 in the hospital.\par \par 6.  Nonspecific rash on his scalp perhaps related to one of the new medications that he is on including flecainide.

## 2022-04-12 NOTE — DISCUSSION/SUMMARY
[FreeTextEntry1] : The patient is quite stable at the present time.  He remains in normal sinus rhythm.  I do not feel he continues to need flecainide and perhaps his rash on his scalp is related to this\par \par I did suggest that he see dermatology to evaluate the rash.\par He does have a follow-up with electrophysiology.  I believe his episodes of paroxysmal A. fib related to alcohol and not any underlying cardiac disease.\par \par He needs follow-up with Dr. Baez concerning his renal insufficiency etc.\par

## 2022-04-12 NOTE — HISTORY OF PRESENT ILLNESS
[FreeTextEntry1] : Jacob is 61 years old with a history of gout, dormant sarcoidosis borderline diabetes and a history of some intermittent renal insufficiency.  I had done a noninvasive cardiac work-up in the past which my recollection was that it was unremarkable\par \par He recently has been drinking heavy and admitted himself to [facility] for detoxification.  He was noted to be in atrial fibrillation.  He underwent a ADY DC cardioversion to sinus rhythm and was placed on metoprolol 25 twice daily flecainide 100 twice daily and magnesium folic acid along with his allopurinol and of course started on Eliquis 5 twice daily\par \par He has now been on alpha alcohol for some time and feels well.\par \par Blood tests in the hospital\par Hemoglobin 10.6 hematocrit 32.5\par Calcium 9.68 potassium 3.93 BUN 30.5 creatinine 1.56 sodium 147 GFR 46\par Chest x-ray PA and lateral mild diffuse increased interstitial markings in both lung fields no active infiltrate mild uncoiling of the aorta you can see\par \par Presently he is feeling well without palpitations dizziness chest pain or shortness of breath.  He continues to abstain from alcohol.  He is in excellent spirits.  He denies palpitations edema orthopnea PND\par \par EKG March 8, 2022 normal sinus rhythm RSR prime in V1 otherwise is within normal limits\par \par Presently he is feeling well without palpitations shortness of breath or chest pain.  He recently developed a rash on his head with reddish papules and some degree of pruritus and headache.  He stopped flecainide but continues on his other medications\par \par He has not had any alcohol now for some time and continues to abstain

## 2022-04-13 ENCOUNTER — APPOINTMENT (OUTPATIENT)
Dept: ELECTROPHYSIOLOGY | Facility: CLINIC | Age: 62
End: 2022-04-13

## 2022-04-13 VITALS
WEIGHT: 232 LBS | HEIGHT: 71 IN | BODY MASS INDEX: 32.48 KG/M2 | TEMPERATURE: 97.7 F | OXYGEN SATURATION: 96 % | SYSTOLIC BLOOD PRESSURE: 120 MMHG | DIASTOLIC BLOOD PRESSURE: 60 MMHG | HEART RATE: 65 BPM

## 2022-04-13 RX ORDER — PNV NO.95/FERROUS FUM/FOLIC AC 28MG-0.8MG
TABLET ORAL
Refills: 0 | Status: DISCONTINUED | COMMUNITY
End: 2022-04-13

## 2022-04-13 RX ORDER — FOLIC ACID/MULTIVIT,IRON,MINER 0.4MG-18MG
TABLET ORAL
Refills: 0 | Status: DISCONTINUED | COMMUNITY
End: 2022-04-13

## 2022-04-25 ENCOUNTER — RX RENEWAL (OUTPATIENT)
Age: 62
End: 2022-04-25

## 2022-05-09 ENCOUNTER — OUTPATIENT (OUTPATIENT)
Dept: OUTPATIENT SERVICES | Facility: HOSPITAL | Age: 62
LOS: 1 days | End: 2022-05-09
Payer: MEDICARE

## 2022-05-09 VITALS
DIASTOLIC BLOOD PRESSURE: 76 MMHG | HEIGHT: 71 IN | WEIGHT: 230.16 LBS | TEMPERATURE: 98 F | SYSTOLIC BLOOD PRESSURE: 124 MMHG | OXYGEN SATURATION: 98 % | RESPIRATION RATE: 16 BRPM | HEART RATE: 56 BPM

## 2022-05-09 DIAGNOSIS — Z91.89 OTHER SPECIFIED PERSONAL RISK FACTORS, NOT ELSEWHERE CLASSIFIED: ICD-10-CM

## 2022-05-09 DIAGNOSIS — F10.20 ALCOHOL DEPENDENCE, UNCOMPLICATED: ICD-10-CM

## 2022-05-09 DIAGNOSIS — Z29.9 ENCOUNTER FOR PROPHYLACTIC MEASURES, UNSPECIFIED: ICD-10-CM

## 2022-05-09 DIAGNOSIS — E78.5 HYPERLIPIDEMIA, UNSPECIFIED: ICD-10-CM

## 2022-05-09 DIAGNOSIS — G35 MULTIPLE SCLEROSIS: ICD-10-CM

## 2022-05-09 DIAGNOSIS — I10 ESSENTIAL (PRIMARY) HYPERTENSION: ICD-10-CM

## 2022-05-09 DIAGNOSIS — Z86.79 PERSONAL HISTORY OF OTHER DISEASES OF THE CIRCULATORY SYSTEM: ICD-10-CM

## 2022-05-09 DIAGNOSIS — Z01.818 ENCOUNTER FOR OTHER PREPROCEDURAL EXAMINATION: ICD-10-CM

## 2022-05-09 DIAGNOSIS — I63.9 CEREBRAL INFARCTION, UNSPECIFIED: ICD-10-CM

## 2022-05-09 DIAGNOSIS — Z78.9 OTHER SPECIFIED HEALTH STATUS: Chronic | ICD-10-CM

## 2022-05-09 LAB
ALBUMIN SERPL ELPH-MCNC: 3.9 G/DL — SIGNIFICANT CHANGE UP (ref 3.3–5.2)
ALP SERPL-CCNC: 78 U/L — SIGNIFICANT CHANGE UP (ref 40–120)
ALT FLD-CCNC: 6 U/L — SIGNIFICANT CHANGE UP
ANION GAP SERPL CALC-SCNC: 13 MMOL/L — SIGNIFICANT CHANGE UP (ref 5–17)
APTT BLD: 32.8 SEC — SIGNIFICANT CHANGE UP (ref 27.5–35.5)
AST SERPL-CCNC: 20 U/L — SIGNIFICANT CHANGE UP
BASOPHILS # BLD AUTO: 0.03 K/UL — SIGNIFICANT CHANGE UP (ref 0–0.2)
BASOPHILS NFR BLD AUTO: 0.5 % — SIGNIFICANT CHANGE UP (ref 0–2)
BILIRUB SERPL-MCNC: 0.3 MG/DL — LOW (ref 0.4–2)
BLD GP AB SCN SERPL QL: SIGNIFICANT CHANGE UP
BUN SERPL-MCNC: 18.9 MG/DL — SIGNIFICANT CHANGE UP (ref 8–20)
CALCIUM SERPL-MCNC: 9.6 MG/DL — SIGNIFICANT CHANGE UP (ref 8.6–10.2)
CHLORIDE SERPL-SCNC: 103 MMOL/L — SIGNIFICANT CHANGE UP (ref 98–107)
CO2 SERPL-SCNC: 24 MMOL/L — SIGNIFICANT CHANGE UP (ref 22–29)
CREAT SERPL-MCNC: 1.33 MG/DL — HIGH (ref 0.5–1.3)
EGFR: 60 ML/MIN/1.73M2 — SIGNIFICANT CHANGE UP
EOSINOPHIL # BLD AUTO: 0.28 K/UL — SIGNIFICANT CHANGE UP (ref 0–0.5)
EOSINOPHIL NFR BLD AUTO: 4.3 % — SIGNIFICANT CHANGE UP (ref 0–6)
GLUCOSE SERPL-MCNC: 118 MG/DL — HIGH (ref 70–99)
HCT VFR BLD CALC: 37.4 % — LOW (ref 39–50)
HGB BLD-MCNC: 11.9 G/DL — LOW (ref 13–17)
IMM GRANULOCYTES NFR BLD AUTO: 0.3 % — SIGNIFICANT CHANGE UP (ref 0–1.5)
INR BLD: 1.33 RATIO — HIGH (ref 0.88–1.16)
LYMPHOCYTES # BLD AUTO: 2.11 K/UL — SIGNIFICANT CHANGE UP (ref 1–3.3)
LYMPHOCYTES # BLD AUTO: 32.8 % — SIGNIFICANT CHANGE UP (ref 13–44)
MAGNESIUM SERPL-MCNC: 1.7 MG/DL — SIGNIFICANT CHANGE UP (ref 1.6–2.6)
MCHC RBC-ENTMCNC: 30.4 PG — SIGNIFICANT CHANGE UP (ref 27–34)
MCHC RBC-ENTMCNC: 31.8 GM/DL — LOW (ref 32–36)
MCV RBC AUTO: 95.7 FL — SIGNIFICANT CHANGE UP (ref 80–100)
MONOCYTES # BLD AUTO: 0.44 K/UL — SIGNIFICANT CHANGE UP (ref 0–0.9)
MONOCYTES NFR BLD AUTO: 6.8 % — SIGNIFICANT CHANGE UP (ref 2–14)
NEUTROPHILS # BLD AUTO: 3.56 K/UL — SIGNIFICANT CHANGE UP (ref 1.8–7.4)
NEUTROPHILS NFR BLD AUTO: 55.3 % — SIGNIFICANT CHANGE UP (ref 43–77)
PLATELET # BLD AUTO: 349 K/UL — SIGNIFICANT CHANGE UP (ref 150–400)
POTASSIUM SERPL-MCNC: 4.5 MMOL/L — SIGNIFICANT CHANGE UP (ref 3.5–5.3)
POTASSIUM SERPL-SCNC: 4.5 MMOL/L — SIGNIFICANT CHANGE UP (ref 3.5–5.3)
PROT SERPL-MCNC: 7.8 G/DL — SIGNIFICANT CHANGE UP (ref 6.6–8.7)
PROTHROM AB SERPL-ACNC: 15.5 SEC — HIGH (ref 10.5–13.4)
RBC # BLD: 3.91 M/UL — LOW (ref 4.2–5.8)
RBC # FLD: 13 % — SIGNIFICANT CHANGE UP (ref 10.3–14.5)
SODIUM SERPL-SCNC: 140 MMOL/L — SIGNIFICANT CHANGE UP (ref 135–145)
WBC # BLD: 6.44 K/UL — SIGNIFICANT CHANGE UP (ref 3.8–10.5)
WBC # FLD AUTO: 6.44 K/UL — SIGNIFICANT CHANGE UP (ref 3.8–10.5)

## 2022-05-09 PROCEDURE — 93005 ELECTROCARDIOGRAM TRACING: CPT

## 2022-05-09 PROCEDURE — 93010 ELECTROCARDIOGRAM REPORT: CPT

## 2022-05-09 PROCEDURE — G0463: CPT

## 2022-05-09 RX ORDER — ALLOPURINOL 300 MG
1 TABLET ORAL
Qty: 0 | Refills: 0 | DISCHARGE

## 2022-05-09 RX ORDER — FOLIC ACID 0.8 MG
1 TABLET ORAL
Qty: 0 | Refills: 0 | DISCHARGE

## 2022-05-09 RX ORDER — SODIUM CHLORIDE 9 MG/ML
3 INJECTION INTRAMUSCULAR; INTRAVENOUS; SUBCUTANEOUS ONCE
Refills: 0 | Status: DISCONTINUED | OUTPATIENT
Start: 2022-05-16 | End: 2022-05-17

## 2022-05-09 NOTE — H&P PST ADULT - ASSESSMENT
60 y/o male presents today to PST pending EPS afib ablation with 22 secondary to unspecified afib with Dr. Hermelindo Thurman. Pt. 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 states he was at one point on medication for, gout, anemia, HTN, history MS-previously followed with neurology, denies medications presently has required blood transfusions in the past, renal insufficiency as per chart, HTN, alcoholism as per chart was hospitalized 3/2022-2022 for ETOH withdrawal as per chart. As per chart pt. underwent cardioversion with conversion to NSR as per neeru during hospitalization and was started on BB and eliquis. Pt. admits to SOB with heavy physical exertion, states he can climb one flight of stairs without SOB. BMI 32.1    Patient educated on surgical scrub, COVID testing-pt. advised to contact surgeon to schedule PCR 3 days prior to surgery as per policy, preadmission instructions, and day of procedure medications as per policy and pt. verbalizes understanding and agreement.  Pt. educated and instructed regarding all preoperative instructions and education as per policy via both verbal and written means of communication and pt. verbalized agreement and understanding.  Pt instructed to stop vitamins/supplements/herbal medications/NSAIDS for one week prior to surgery and pt. verbalized agreement and understanding.  Pt. advised to hold eliquis AM of procedure only as per surgeons policy, email sent to surgeon to confirm in setting of history of CVA in past as per pt., and pt. verbalized agreement and understanding.     OPIOID RISK TOOL    CATE EACH BOX THAT APPLIES AND ADD TOTALS AT THE END    FAMILY HISTORY OF SUBSTANCE ABUSE                 FEMALE         MALE                                                Alcohol                             [  ]1 pt          [  ]3pts                                               Illegal Durgs                     [  ]2 pts        [  ]3pts                                               Rx Drugs                           [  ]4 pts        [  ]4 pts    PERSONAL HISTORY OF SUBSTANCE ABUSE                                                                                          Alcohol                             [  ]3 pts       [ x ]3 pts                                               Illegal Drugs                     [  ]4 pts        [  ]4 pts                                               Rx Drugs                           [  ]5 pts        [  ]5 pts    AGE BETWEEN 16-45 YEARS                                      [  ]1 pt         [  ]1 pt    HISTORY OF PREADOLESCENT   SEXUAL ABUSE                                                             [  ]3 pts        [  ]0pts    PSYCHOLOGICAL DISEASE                     ADD, OCD, Bipolar, Schizophrenia        [  ]2 pts         [  ]2 pts                      Depression                                               [  ]1 pt           [  x]1 pt           SCORING TOTAL   (add numbers and type here)              (4)                                     A score of 3 or lower indicated LOW risk for future opioid abuse  A score of 4 to 7 indicated moderate risk for future opioid abuse  A score of 8 or higher indicates a high risk for opioid abuse    CAPRINI SCORE    AGE RELATED RISK FACTORS                                                             [ ] Age 41-60 years                                            (1 Point)  [x ] Age: 61-74 years                                           (2 Points)                 [ ] Age= 75 years                                                (3 Points)             DISEASE RELATED RISK FACTORS                                                       [ ] Edema in the lower extremities                 (1 Point)                     [ ] Varicose veins                                               (1 Point)                                 [ x] BMI > 25 Kg/m2                                            (1 Point)                                  [ ] Serious infection (ie PNA)                            (1 Point)                     [ ] Lung disease ( COPD, Emphysema)            (1 Point)                                                                          [ ] Acute myocardial infarction                         (1 Point)                  [ ] Congestive heart failure (in the previous month)  (1 Point)         [ ] Inflammatory bowel disease                            (1 Point)                  [ ] Central venous access, PICC or Port               (2 points)       (within the last month)                                                                [ ] Stroke (in the previous month)                        (5 Points)    [ ] Previous or present malignancy                       (2 points)                                                                                                                                                         HEMATOLOGY RELATED FACTORS                                                         [ ] Prior episodes of VTE                                     (3 Points)                     [ ] Positive family history for VTE                      (3 Points)                  [ ] Prothrombin 73175 A                                     (3 Points)                     [ ] Factor V Leiden                                                (3 Points)                        [ ] Lupus anticoagulants                                      (3 Points)                                                           [ ] Anticardiolipin antibodies                              (3 Points)                                                       [ ] High homocysteine in the blood                   (3 Points)                                             [ ] Other congenital or acquired thrombophilia      (3 Points)                                                [ ] Heparin induced thrombocytopenia                  (3 Points)                                        MOBILITY RELATED FACTORS  [ ] Bed rest                                                         (1 Point)  [ ] Plaster cast                                                    (2 points)  [ ] Bed bound for more than 72 hours           (2 Points)    GENDER SPECIFIC FACTORS  [ ] Pregnancy or had a baby within the last month   (1 Point)  [ ] Post-partum < 6 weeks                                   (1 Point)  [ ] Hormonal therapy  or oral contraception   (1 Point)  [ ] History of pregnancy complications              (1 point)  [ ] Unexplained or recurrent              (1 Point)    OTHER RISK FACTORS                                           (1 Point)  [ ] BMI >40, smoking, diabetes requiring insulin, chemotherapy  blood transfusions and length of surgery over 2 hours    SURGERY RELATED RISK FACTORS  [ ]  Section within the last month     (1 Point)  [ ] Minor surgery                                                  (1 Point)  [ ] Arthroscopic surgery                                       (2 Points)  [x ] Planned major surgery lasting more            (2 Points)      than 45 minutes     [ ] Elective hip or knee joint replacement       (5 points)       surgery                                                TRAUMA RELATED RISK FACTORS  [ ] Fracture of the hip, pelvis, or leg                       (5 Points)  [ ] Spinal cord injury resulting in paralysis             (5 points)       (in the previous month)    [ ] Paralysis  (less than 1 month)                             (5 Points)  [ ] Multiple Trauma within 1 month                        (5 Points)    Total Score [    5    ]    Caprini Score 0-2: Low Risk, NO VTE prophylaxis required for most patients, encourage ambulation  Caprini Score 3-6: Moderate Risk , pharmacologic VTE prophylaxis is indicated for most patients (in the absence of contraindications)  Caprini Score Greater than or =7: High risk, pharmocologic VTE prophylaxis indicated for most patients (in the absence of contraindications)

## 2022-05-09 NOTE — H&P PST ADULT - NEGATIVE MUSCULOSKELETAL SYMPTOMS
no arthralgia/no arthritis/no joint swelling/no muscle cramps/no muscle weakness/no stiffness/no neck pain/no arm pain L/no arm pain R/no leg pain L/no leg pain R

## 2022-05-09 NOTE — H&P PST ADULT - SKIN/BREAST COMMENTS
skin discoloration he states, states he has told the cardiologist, states he had skin sensitivity on top of head since onset of med. regimen that has resolved.

## 2022-05-09 NOTE — H&P PST ADULT - NEGATIVE PSYCHIATRIC SYMPTOMS
no suicidal ideation/no anxiety/no insomnia/no memory loss/no paranoia/no mood swings/no agitation/no visual hallucinations/no auditory hallucinations/no hyperactivity

## 2022-05-09 NOTE — H&P PST ADULT - LAB RESULTS AND INTERPRETATION
Labs pending Labs pending  5/9/22 14:43 All available labs noted as documented, all abnormal labs noted as documented and have been faxed to PCP. T&S pending. Katy MS, FNP-BC

## 2022-05-09 NOTE — H&P PST ADULT - HISTORY OF PRESENT ILLNESS
62 y/o male presents today to PST pending EPS afib ablation with 5/16/22 secondary to unspecified afib with Dr. Hermelindo Thurman. Pt. 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 states he was at one point on medication for, gout, anemia, HTN, history MS-previously followed with neurology, denies medications presently has required blood transfusions in the past, renal insufficiency as per chart, HTN, alcoholism as per chart was hospitalized 3/2022-4/2022 for ETOH withdrawal as per chart. As per chart pt. underwent cardioversion with conversion to NSR as per neeru during hospitalization and was started on BB and eliquis. Pt. admits to SOB with heavy physical exertion, states he can climb one flight of stairs without SOB.     As per chart:  Nuclear stress test 2/28/22: "IMPRESSIONS:Normal Study  * The left ventricle was normal in size.  * Tracer uptake was homogeneous throughout the left  ventricle.  * Normal study; no evidence for myocardial infarction or  ischemia.  * Gated wall motion analysis was performed, and shows  normal wall motion."            "ADY 3/1/22  Summary:   1. Left ventricular ejection fraction, by visual estimation, is 60 to   65%.   2. Normal global left ventricular systolic function.   3. Normal right ventricular size and function.   4. There is no evidence of pericardial effusion.   5. Mild mitral valve regurgitation.   6. Color flow doppler and intravenous injection of agitated saline   demonstrates the presence of an intact intra atrial septum.   7. Intact intra-atrial septum without shunt.   8. No left atrial appendage thrombus and decreased left atrial appendage   velocities.   9. Following completion of the ADY, Patient underwent Cardioversion done   by Dr Hermelindo Thurman."

## 2022-05-09 NOTE — H&P PST ADULT - NSICDXFAMILYHX_GEN_ALL_CORE_FT
FAMILY HISTORY:  Father  Still living? No  FH: heart disease, Age at diagnosis: Age Unknown  FHx: colon cancer, Age at diagnosis: Age Unknown    Mother  Still living? No  FH: type 2 diabetes, Age at diagnosis: Age Unknown

## 2022-05-09 NOTE — H&P PST ADULT - NEGATIVE ENMT SYMPTOMS
no hearing difficulty/no ear pain/no tinnitus/no vertigo/no sinus symptoms/no nasal discharge/no nasal obstruction/no post-nasal discharge/no nose bleeds/no recurrent cold sores/no abnormal taste sensation/no gum bleeding/no dry mouth/no throat pain

## 2022-05-12 ENCOUNTER — NON-APPOINTMENT (OUTPATIENT)
Age: 62
End: 2022-05-12

## 2022-05-13 PROBLEM — Z92.29 PERSONAL HISTORY OF OTHER DRUG THERAPY: Chronic | Status: ACTIVE | Noted: 2022-05-09

## 2022-05-13 PROBLEM — I63.9 CEREBRAL INFARCTION, UNSPECIFIED: Chronic | Status: ACTIVE | Noted: 2022-05-09

## 2022-05-13 PROBLEM — N28.9 DISORDER OF KIDNEY AND URETER, UNSPECIFIED: Chronic | Status: ACTIVE | Noted: 2022-05-09

## 2022-05-13 PROBLEM — Z86.59 PERSONAL HISTORY OF OTHER MENTAL AND BEHAVIORAL DISORDERS: Chronic | Status: ACTIVE | Noted: 2022-05-09

## 2022-05-13 PROBLEM — Z86.79 PERSONAL HISTORY OF OTHER DISEASES OF THE CIRCULATORY SYSTEM: Chronic | Status: ACTIVE | Noted: 2022-05-09

## 2022-05-13 PROBLEM — F10.20 ALCOHOL DEPENDENCE, UNCOMPLICATED: Chronic | Status: ACTIVE | Noted: 2022-05-09

## 2022-05-16 ENCOUNTER — INPATIENT (INPATIENT)
Facility: HOSPITAL | Age: 62
LOS: 0 days | Discharge: HOME | DRG: 274 | End: 2022-05-17
Attending: INTERNAL MEDICINE | Admitting: INTERNAL MEDICINE
Payer: MEDICARE

## 2022-05-16 ENCOUNTER — TRANSCRIPTION ENCOUNTER (OUTPATIENT)
Age: 62
End: 2022-05-16

## 2022-05-16 VITALS
DIASTOLIC BLOOD PRESSURE: 83 MMHG | HEART RATE: 61 BPM | SYSTOLIC BLOOD PRESSURE: 152 MMHG | OXYGEN SATURATION: 99 % | TEMPERATURE: 98 F | RESPIRATION RATE: 18 BRPM

## 2022-05-16 DIAGNOSIS — Z78.9 OTHER SPECIFIED HEALTH STATUS: Chronic | ICD-10-CM

## 2022-05-16 DIAGNOSIS — I48.91 UNSPECIFIED ATRIAL FIBRILLATION: ICD-10-CM

## 2022-05-16 LAB
ABO RH CONFIRMATION: SIGNIFICANT CHANGE UP
ANION GAP SERPL CALC-SCNC: 14 MMOL/L — SIGNIFICANT CHANGE UP (ref 5–17)
BUN SERPL-MCNC: 22.4 MG/DL — HIGH (ref 8–20)
CALCIUM SERPL-MCNC: 9.2 MG/DL — SIGNIFICANT CHANGE UP (ref 8.6–10.2)
CHLORIDE SERPL-SCNC: 104 MMOL/L — SIGNIFICANT CHANGE UP (ref 98–107)
CO2 SERPL-SCNC: 22 MMOL/L — SIGNIFICANT CHANGE UP (ref 22–29)
CREAT SERPL-MCNC: 1.27 MG/DL — SIGNIFICANT CHANGE UP (ref 0.5–1.3)
EGFR: 63 ML/MIN/1.73M2 — SIGNIFICANT CHANGE UP
GLUCOSE SERPL-MCNC: 114 MG/DL — HIGH (ref 70–99)
MAGNESIUM SERPL-MCNC: 1.6 MG/DL — SIGNIFICANT CHANGE UP (ref 1.6–2.6)
POTASSIUM SERPL-MCNC: 4.8 MMOL/L — SIGNIFICANT CHANGE UP (ref 3.5–5.3)
POTASSIUM SERPL-SCNC: 4.8 MMOL/L — SIGNIFICANT CHANGE UP (ref 3.5–5.3)
SODIUM SERPL-SCNC: 140 MMOL/L — SIGNIFICANT CHANGE UP (ref 135–145)

## 2022-05-16 PROCEDURE — 93657 TX L/R ATRIAL FIB ADDL: CPT

## 2022-05-16 PROCEDURE — 93656 COMPRE EP EVAL ABLTJ ATR FIB: CPT

## 2022-05-16 PROCEDURE — 93010 ELECTROCARDIOGRAM REPORT: CPT

## 2022-05-16 RX ORDER — POTASSIUM CHLORIDE 20 MEQ
0 PACKET (EA) ORAL
Qty: 0 | Refills: 0 | DISCHARGE

## 2022-05-16 RX ORDER — AMLODIPINE BESYLATE 2.5 MG/1
10 TABLET ORAL DAILY
Refills: 0 | Status: DISCONTINUED | OUTPATIENT
Start: 2022-05-16 | End: 2022-05-17

## 2022-05-16 RX ORDER — PANTOPRAZOLE SODIUM 20 MG/1
40 TABLET, DELAYED RELEASE ORAL EVERY 12 HOURS
Refills: 0 | Status: DISCONTINUED | OUTPATIENT
Start: 2022-05-16 | End: 2022-05-17

## 2022-05-16 RX ORDER — ONDANSETRON 8 MG/1
4 TABLET, FILM COATED ORAL ONCE
Refills: 0 | Status: DISCONTINUED | OUTPATIENT
Start: 2022-05-16 | End: 2022-05-17

## 2022-05-16 RX ORDER — MAGNESIUM SULFATE 500 MG/ML
2 VIAL (ML) INJECTION ONCE
Refills: 0 | Status: COMPLETED | OUTPATIENT
Start: 2022-05-16 | End: 2022-05-16

## 2022-05-16 RX ORDER — ASCORBIC ACID 60 MG
0 TABLET,CHEWABLE ORAL
Qty: 0 | Refills: 0 | DISCHARGE

## 2022-05-16 RX ORDER — ONDANSETRON 8 MG/1
4 TABLET, FILM COATED ORAL EVERY 6 HOURS
Refills: 0 | Status: DISCONTINUED | OUTPATIENT
Start: 2022-05-16 | End: 2022-05-16

## 2022-05-16 RX ORDER — BENZOCAINE AND MENTHOL 5; 1 G/100ML; G/100ML
1 LIQUID ORAL
Refills: 0 | Status: DISCONTINUED | OUTPATIENT
Start: 2022-05-16 | End: 2022-05-17

## 2022-05-16 RX ORDER — ALLOPURINOL 300 MG
100 TABLET ORAL
Refills: 0 | Status: DISCONTINUED | OUTPATIENT
Start: 2022-05-16 | End: 2022-05-17

## 2022-05-16 RX ORDER — AMLODIPINE BESYLATE 2.5 MG/1
1 TABLET ORAL
Qty: 0 | Refills: 0 | DISCHARGE

## 2022-05-16 RX ORDER — MAGNESIUM OXIDE 400 MG ORAL TABLET 241.3 MG
400 TABLET ORAL
Refills: 0 | Status: DISCONTINUED | OUTPATIENT
Start: 2022-05-16 | End: 2022-05-17

## 2022-05-16 RX ORDER — APIXABAN 2.5 MG/1
5 TABLET, FILM COATED ORAL
Refills: 0 | Status: DISCONTINUED | OUTPATIENT
Start: 2022-05-16 | End: 2022-05-17

## 2022-05-16 RX ORDER — METOPROLOL TARTRATE 50 MG
25 TABLET ORAL
Refills: 0 | Status: DISCONTINUED | OUTPATIENT
Start: 2022-05-16 | End: 2022-05-17

## 2022-05-16 RX ORDER — CHOLECALCIFEROL (VITAMIN D3) 125 MCG
1 CAPSULE ORAL
Qty: 0 | Refills: 0 | DISCHARGE

## 2022-05-16 RX ORDER — ALPRAZOLAM 0.25 MG
0.25 TABLET ORAL EVERY 8 HOURS
Refills: 0 | Status: DISCONTINUED | OUTPATIENT
Start: 2022-05-16 | End: 2022-05-17

## 2022-05-16 RX ORDER — ACETAMINOPHEN 500 MG
650 TABLET ORAL EVERY 6 HOURS
Refills: 0 | Status: DISCONTINUED | OUTPATIENT
Start: 2022-05-16 | End: 2022-05-17

## 2022-05-16 RX ORDER — SUCRALFATE 1 G
1 TABLET ORAL EVERY 12 HOURS
Refills: 0 | Status: DISCONTINUED | OUTPATIENT
Start: 2022-05-16 | End: 2022-05-17

## 2022-05-16 RX ORDER — THIAMINE MONONITRATE (VIT B1) 100 MG
100 TABLET ORAL DAILY
Refills: 0 | Status: DISCONTINUED | OUTPATIENT
Start: 2022-05-16 | End: 2022-05-17

## 2022-05-16 RX ORDER — THIAMINE MONONITRATE (VIT B1) 100 MG
1 TABLET ORAL
Qty: 0 | Refills: 0 | DISCHARGE

## 2022-05-16 RX ORDER — ASCORBIC ACID 60 MG
500 TABLET,CHEWABLE ORAL DAILY
Refills: 0 | Status: DISCONTINUED | OUTPATIENT
Start: 2022-05-16 | End: 2022-05-17

## 2022-05-16 RX ORDER — FUROSEMIDE 40 MG
20 TABLET ORAL ONCE
Refills: 0 | Status: COMPLETED | OUTPATIENT
Start: 2022-05-16 | End: 2022-05-16

## 2022-05-16 RX ORDER — FLECAINIDE ACETATE 50 MG
100 TABLET ORAL EVERY 12 HOURS
Refills: 0 | Status: DISCONTINUED | OUTPATIENT
Start: 2022-05-16 | End: 2022-05-17

## 2022-05-16 RX ORDER — ALLOPURINOL 300 MG
1 TABLET ORAL
Qty: 0 | Refills: 0 | DISCHARGE

## 2022-05-16 RX ORDER — OXYCODONE HYDROCHLORIDE 5 MG/1
5 TABLET ORAL EVERY 6 HOURS
Refills: 0 | Status: DISCONTINUED | OUTPATIENT
Start: 2022-05-16 | End: 2022-05-17

## 2022-05-16 RX ADMIN — Medication 20 MILLIGRAM(S): at 16:18

## 2022-05-16 RX ADMIN — AMLODIPINE BESYLATE 10 MILLIGRAM(S): 2.5 TABLET ORAL at 15:19

## 2022-05-16 RX ADMIN — Medication 650 MILLIGRAM(S): at 13:29

## 2022-05-16 RX ADMIN — Medication 100 MILLIGRAM(S): at 17:14

## 2022-05-16 RX ADMIN — Medication 100 MILLIGRAM(S): at 17:11

## 2022-05-16 RX ADMIN — Medication 650 MILLIGRAM(S): at 15:16

## 2022-05-16 RX ADMIN — Medication 500 MILLIGRAM(S): at 21:44

## 2022-05-16 RX ADMIN — MAGNESIUM OXIDE 400 MG ORAL TABLET 400 MILLIGRAM(S): 241.3 TABLET ORAL at 17:14

## 2022-05-16 RX ADMIN — PANTOPRAZOLE SODIUM 40 MILLIGRAM(S): 20 TABLET, DELAYED RELEASE ORAL at 17:11

## 2022-05-16 RX ADMIN — Medication 100 MILLIGRAM(S): at 17:15

## 2022-05-16 RX ADMIN — Medication 1 GRAM(S): at 17:15

## 2022-05-16 RX ADMIN — Medication 50 GRAM(S): at 12:26

## 2022-05-16 RX ADMIN — Medication 25 MILLIGRAM(S): at 17:14

## 2022-05-16 RX ADMIN — APIXABAN 5 MILLIGRAM(S): 2.5 TABLET, FILM COATED ORAL at 16:17

## 2022-05-16 NOTE — PROGRESS NOTE ADULT - SUBJECTIVE AND OBJECTIVE BOX
Admission Criteria  Please admit the patient to the following service: CARDIOLOGY      Major Criteria:    - Continuous EKG monitoring is required for condition causing arrhythmia (hyperkalemia, etc)  - Significant volume load > 200 ml      Admit to: 3GUL     Patient is being admitted to the inpatient service due to high risk characteristics and need for further management/monitoring and is considered to be at a significantly increased risk of major adverse cardiac and vascular events if discharged.

## 2022-05-16 NOTE — DISCHARGE NOTE PROVIDER - NSDCFUADDINST_GEN_ALL_CORE_FT
Follow up with Dr. Thurman in 2 - 4 weeks. Our office will contact you in 3-5 days to schedule this appointment. Please call [phone] with questions or concerns. Follow up with Dr. Thurman in 2 - 4 weeks. Our office will contact you in 3-5 days to schedule this appointment. Please call [phone] with questions or concerns.  Follow up with your primary MD within 7 days for repeat creatinine check Follow up with Dr. Thurman in 2 - 4 weeks. Our office will contact you in 3-5 days to schedule this appointment. Please call [phone] with questions or concerns.  Follow up with your primary MD within 7 days for repeat creatinine check  Resume Flecainide for 30 days

## 2022-05-16 NOTE — PROGRESS NOTE ADULT - SUBJECTIVE AND OBJECTIVE BOX
PROCEDURE(S): Radiofrequency Ablation of Atrial Fibrillation    ELECTROPHYSIOLOGIST(S): Hermelindo Thurman MD         COMPLICATIONS:  none        DISPOSITION: observation      CONDITION: stable    Pt doing well s/p elective radiofrequency atrial fibrillation ablation ([facility]/PVI, CTI line) access via b/l femoral veins, hemostasis achieved with figure of 8 suture b/l.      MEDICATIONS  (STANDING):  allopurinol 100 milliGRAM(s) Oral two times a day  amLODIPine   Tablet 10 milliGRAM(s) Oral daily  apixaban 5 milliGRAM(s) Oral <User Schedule>  ascorbic acid 500 milliGRAM(s) Oral daily  flecainide 100 milliGRAM(s) Oral every 12 hours  magnesium oxide 400 milliGRAM(s) Oral three times a day with meals  metoprolol tartrate 25 milliGRAM(s) Oral two times a day  pantoprazole    Tablet 40 milliGRAM(s) Oral every 12 hours  sodium chloride 0.9% lock flush 3 milliLiter(s) IV Push Once  sucralfate suspension 1 Gram(s) Oral every 12 hours  thiamine 100 milliGRAM(s) Oral daily    MEDICATIONS  (PRN):  acetaminophen     Tablet .. 650 milliGRAM(s) Oral every 6 hours PRN Moderate Pain (4 - 6)  ALPRAZolam 0.25 milliGRAM(s) Oral every 8 hours PRN anxiety / insomnia  aluminum hydroxide/magnesium hydroxide/simethicone Suspension 30 milliLiter(s) Oral every 4 hours PRN Dyspepsia  benzocaine 15 mG/menthol 3.6 mG Lozenge 1 Lozenge Oral every 2 hours PRN Sore Throat  ondansetron  IVPB 4 milliGRAM(s) IV Intermittent every 6 hours PRN Nausea and/or Vomiting  oxyCODONE    IR 5 milliGRAM(s) Oral every 6 hours PRN Severe Pain (7 - 10)      Allergies    No Known Drug Allergies  Seafood (Other)      VS:  T(C): 36.6 (05-16-22 @ 07:40), Max: 36.6 (05-16-22 @ 07:40)  HR: 61 (05-16-22 @ 07:40) (61 - 61)  BP: 152/83 (05-16-22 @ 07:40) (152/83 - 152/83)  RR: 18 (05-16-22 @ 07:40) (18 - 18)  SpO2: 99% (05-16-22 @ 07:40) (99% - 99%)        Post procedure VS:  HR:  BP:  RR:  SpO2:      Exam:  General: NAD, A&O x 3  Card: S1/S2, RRR, no m/g/r  Resp: lungs CTA b/l  Abd: S/NT/ND  Groins: hemostatic sutures in place; sites C/D/I; no bleeding, hematoma, erythema, exudate or edema  Ext: no edema; distal pulses intact    I/O's    Input:     ADY: Deferred, ARSEN cleared intraoperatively with ICE    ECG: pending    Assessment:   61y Male h/o HTN, gout, remote sarcoidosis of the lung, alcohol abuse, and atrial fibrillation, presented today 5/16/22 for and is now s/p  elective radiofrequency atrial fibrillation ablation ([facility]/PVI, CTI line) access via b/l femoral veins, hemostasis achieved with figure of 8 suture b/l.      Plan:   Bedrest x 4 hours, then OOB with assistance and progress as tolerated.   Groin sutures to be removed by EP service in AM.     Pending groin status: Resume Eliquis 5mg q12hr at 1600  Start Protonix 40mg BID x 2 weeks then daily X 6 weeks.     Start Carafate 1gm BID x 2 weeks.   Resume other home medications including Metoprolol and Flecainide   Strict I/Os.  Please encourage incentive spirometry and ambulation once able.  Observation and monitoring on telemetry overnight with anticipated discharge in the AM and outpt follow up in 1 month.     PROCEDURE(S): Radiofrequency Ablation of Atrial Fibrillation    ELECTROPHYSIOLOGIST(S): Hermelindo Thurman MD         COMPLICATIONS:  none        DISPOSITION: observation      CONDITION: stable    Pt doing well s/p elective radiofrequency atrial fibrillation ablation ([facility]/PVI, CTI line) access via b/l femoral veins, hemostasis achieved with figure of 8 suture b/l. Pt reports no complaints post procedure.    MEDICATIONS  (STANDING):  allopurinol 100 milliGRAM(s) Oral two times a day  amLODIPine   Tablet 10 milliGRAM(s) Oral daily  apixaban 5 milliGRAM(s) Oral <User Schedule>  ascorbic acid 500 milliGRAM(s) Oral daily  flecainide 100 milliGRAM(s) Oral every 12 hours  magnesium oxide 400 milliGRAM(s) Oral three times a day with meals  metoprolol tartrate 25 milliGRAM(s) Oral two times a day  pantoprazole    Tablet 40 milliGRAM(s) Oral every 12 hours  sodium chloride 0.9% lock flush 3 milliLiter(s) IV Push Once  sucralfate suspension 1 Gram(s) Oral every 12 hours  thiamine 100 milliGRAM(s) Oral daily    MEDICATIONS  (PRN):  acetaminophen     Tablet .. 650 milliGRAM(s) Oral every 6 hours PRN Moderate Pain (4 - 6)  ALPRAZolam 0.25 milliGRAM(s) Oral every 8 hours PRN anxiety / insomnia  aluminum hydroxide/magnesium hydroxide/simethicone Suspension 30 milliLiter(s) Oral every 4 hours PRN Dyspepsia  benzocaine 15 mG/menthol 3.6 mG Lozenge 1 Lozenge Oral every 2 hours PRN Sore Throat  ondansetron  IVPB 4 milliGRAM(s) IV Intermittent every 6 hours PRN Nausea and/or Vomiting  oxyCODONE    IR 5 milliGRAM(s) Oral every 6 hours PRN Severe Pain (7 - 10)      Allergies    No Known Drug Allergies  Seafood (Other)      VS:  T(C): 36.6 (05-16-22 @ 07:40), Max: 36.6 (05-16-22 @ 07:40)  HR: 61 (05-16-22 @ 07:40) (61 - 61)  BP: 152/83 (05-16-22 @ 07:40) (152/83 - 152/83)  RR: 18 (05-16-22 @ 07:40) (18 - 18)  SpO2: 99% (05-16-22 @ 07:40) (99% - 99%)        Post procedure VS:  HR: 70  BP: 166/83  RR: 18  SpO2: 97%      Exam:  General: NAD, Sleepy s/p anesthesia but answering questions appropriately  Card: S1/S2, RRR, no m/g/r  Resp: lungs CTA b/l  Abd: S/NT/ND  Groins: hemostatic sutures in place; sites C/D/I; no bleeding, hematoma, erythema, exudate or edema  Ext: no edema; distal pulses intact    I/O's    Input: 1580    ADY: Deferred, ARSEN cleared intraoperatively with ICE    ECG: pending    Assessment:   61y Male h/o HTN, gout, remote sarcoidosis of the lung, alcohol abuse, and atrial fibrillation, presented today 5/16/22 for and is now s/p  elective radiofrequency atrial fibrillation ablation ([facility]/PVI, CTI line) access via b/l femoral veins, hemostasis achieved with figure of 8 suture b/l. Pt reports no complaints s/p procedure.     Plan:   Bedrest x 4 hours, then OOB with assistance and progress as tolerated.   Groin sutures to be removed by EP service in AM.     Pending groin status: Resume Eliquis 5mg q12hr at 1600  Start Protonix 40mg BID x 2 weeks then daily X 6 weeks.     Start Carafate 1gm BID x 2 weeks.   Lasix 20mg IVP at 1600, followed by 20mg po QD x 3 days  Resume other home medications including Metoprolol and Flecainide   Strict I/Os.  Please encourage incentive spirometry and ambulation once able.  Observation and monitoring on telemetry overnight with anticipated discharge in the AM and outpt follow up in 1 month.     PROCEDURE(S): Radiofrequency Ablation of Atrial Fibrillation    ELECTROPHYSIOLOGIST(S): Hermelindo Thurman MD         COMPLICATIONS:  none        DISPOSITION: observation      CONDITION: stable    Pt doing well s/p elective radiofrequency atrial fibrillation ablation ([facility]/PVI, CTI line) access via b/l femoral veins, hemostasis achieved with figure of 8 suture b/l. Pt reports no complaints post procedure.    MEDICATIONS  (STANDING):  allopurinol 100 milliGRAM(s) Oral two times a day  amLODIPine   Tablet 10 milliGRAM(s) Oral daily  apixaban 5 milliGRAM(s) Oral <User Schedule>  ascorbic acid 500 milliGRAM(s) Oral daily  flecainide 100 milliGRAM(s) Oral every 12 hours  magnesium oxide 400 milliGRAM(s) Oral three times a day with meals  metoprolol tartrate 25 milliGRAM(s) Oral two times a day  pantoprazole    Tablet 40 milliGRAM(s) Oral every 12 hours  sodium chloride 0.9% lock flush 3 milliLiter(s) IV Push Once  sucralfate suspension 1 Gram(s) Oral every 12 hours  thiamine 100 milliGRAM(s) Oral daily    MEDICATIONS  (PRN):  acetaminophen     Tablet .. 650 milliGRAM(s) Oral every 6 hours PRN Moderate Pain (4 - 6)  ALPRAZolam 0.25 milliGRAM(s) Oral every 8 hours PRN anxiety / insomnia  aluminum hydroxide/magnesium hydroxide/simethicone Suspension 30 milliLiter(s) Oral every 4 hours PRN Dyspepsia  benzocaine 15 mG/menthol 3.6 mG Lozenge 1 Lozenge Oral every 2 hours PRN Sore Throat  ondansetron  IVPB 4 milliGRAM(s) IV Intermittent every 6 hours PRN Nausea and/or Vomiting  oxyCODONE    IR 5 milliGRAM(s) Oral every 6 hours PRN Severe Pain (7 - 10)      Allergies    No Known Drug Allergies  Seafood (Other)      VS:  T(C): 36.6 (05-16-22 @ 07:40), Max: 36.6 (05-16-22 @ 07:40)  HR: 61 (05-16-22 @ 07:40) (61 - 61)  BP: 152/83 (05-16-22 @ 07:40) (152/83 - 152/83)  RR: 18 (05-16-22 @ 07:40) (18 - 18)  SpO2: 99% (05-16-22 @ 07:40) (99% - 99%)        Post procedure VS:  HR: 70  BP: 166/83  RR: 18  SpO2: 97%      Exam:  General: NAD, Sleepy s/p anesthesia but answering questions appropriately  Card: S1/S2, RRR, no m/g/r  Resp: lungs CTA b/l  Abd: S/NT/ND  Groins: hemostatic sutures in place; sites C/D/I; no bleeding, hematoma, erythema, exudate or edema  Ext: no edema; distal pulses intact    I/O's    Input: 1580    ADY: Deferred, ARSEN cleared intraoperatively with ICE    ECG: Sinus rhythm with intact conduction. . .     Assessment:   61y Male h/o HTN, gout, remote sarcoidosis of the lung, alcohol abuse, and atrial fibrillation, presented today 5/16/22 for and is now s/p  elective radiofrequency atrial fibrillation ablation ([facility]/PVI, CTI line) access via b/l femoral veins, hemostasis achieved with figure of 8 suture b/l. Pt reports no complaints s/p procedure.     Plan:   Bedrest x 4 hours, then OOB with assistance and progress as tolerated.   Groin sutures to be removed by EP service in AM.     Pending groin status: Resume Eliquis 5mg q12hr at 1600  Start Protonix 40mg BID x 2 weeks then daily X 6 weeks.     Start Carafate 1gm BID x 2 weeks.   Lasix 20mg IVP at 1600, followed by 20mg po QD x 3 days  2g Mg given for serum Mg of 1.6 on arrival  Resume other home medications including Metoprolol and Flecainide   Strict I/Os.  Please encourage incentive spirometry and ambulation once able.  Observation and monitoring on telemetry overnight with anticipated discharge in the AM and outpt follow up in 1 month.

## 2022-05-16 NOTE — DISCHARGE NOTE PROVIDER - NSDCMRMEDTOKEN_GEN_ALL_CORE_FT
Aleve 220 mg oral tablet: 1 tab(s) orally every 12 hours, As Needed  allopurinol 100 mg oral tablet: 1 tab(s) orally 2 times a day  amLODIPine 10 mg oral tablet: 1 tab(s) orally once a day  apixaban 5 mg oral tablet: 1 tab(s) orally every 12 hours  bromelain: orally once a day  flecainide 100 mg oral tablet: 1 tab(s) orally every 12 hours  magnesium oxide 400 mg oral tablet: 1 tab(s) orally once a day  metoprolol tartrate 25 mg oral tablet: 1 tab(s) orally 2 times a day  omega 3: orally once a day  peptiva: orally once a day, As Needed  potassium: once a day  thiamine 100 mg oral tablet: 1 tab(s) orally once a day  tumeric: orally once a day  Vitamin C: orally once a day  Vitamin D3 125 mcg (5000 intl units) oral capsule: 1 cap(s) orally once a day   Aleve 220 mg oral tablet: 1 tab(s) orally every 12 hours, As Needed  allopurinol 100 mg oral tablet: 1 tab(s) orally 2 times a day  amLODIPine 10 mg oral tablet: 1 tab(s) orally once a day  apixaban 5 mg oral tablet: 1 tab(s) orally every 12 hours  bromelain: orally once a day  flecainide 100 mg oral tablet: 1 tab(s) orally every 12 hours  Lasix 20 mg oral tablet: 1 tab(s) orally once a day   magnesium oxide 400 mg oral tablet: 1 tab(s) orally once a day  metoprolol tartrate 25 mg oral tablet: 1 tab(s) orally 2 times a day  omega 3: orally once a day  pantoprazole 40 mg oral delayed release tablet: 1 tab(s) orally every 12 hours x 14 days then once a day for 6 weeks  peptiva: orally once a day, As Needed  potassium: once a day  sucralfate 1 g/10 mL oral suspension: 10 milliliter(s) orally every 12 hours x 14 days   thiamine 100 mg oral tablet: 1 tab(s) orally once a day  tumeric: orally once a day  Vitamin C: orally once a day  Vitamin D3 125 mcg (5000 intl units) oral capsule: 1 cap(s) orally once a day

## 2022-05-16 NOTE — DISCHARGE NOTE PROVIDER - NSDCFUSCHEDAPPT_GEN_ALL_CORE_FT
[facility]  ELECTROPH [address]  Scheduled Appointment: 06/01/2022    Santos Sorto  [facility]  Cardio [address] [facility] [place] [facility]  Scheduled Appointment: 07/12/2022

## 2022-05-16 NOTE — DISCHARGE NOTE PROVIDER - CARE PROVIDER_API CALL
Hermelindo Thurman)  Cardiac Electrophysiology; Cardiovascular Disease; Internal Medicine  [address] [place] [address]  Phone: [phone]  Fax: [phone]  Follow Up Time:     Santos Sorto)  Cardiovascular Disease; Internal Medicine; Interventional Cardiology  [facility] [place] [address]  Phone: [phone]  Fax: [phone]  Follow Up Time:

## 2022-05-16 NOTE — PROGRESS NOTE ADULT - SUBJECTIVE AND OBJECTIVE BOX
62 y/o male, PMHx of HTN, gout, remote sarcoidosis of the lung, alcohol abuse, and atrial fibrillation, presents today for elective AF ablation with Dr. Thurman. Pt initially diagnosed with atrial fibrillation 2/2022 in [facility] while being treated for HF exacerbation.  Pt underwent successful ADY/DCCV for atrial fibrillation while in the hospital and was discharged home on Eliquis metoprolol and flecainide. TTE revealed normal LVEF and no significant valve disease, and a stress test was negative for ischemia or infarct. ADY performed on 3/1/22 showed LVEF of 60 to 65% w/o evidence of ARSEN thrombus. Pt states he has been compliant with his Eliquis since diagnosis and reports his last dose was yesterday morning. Pt reports last PO intake was yesterday evening. Pt reports no complaints at this time. Denies chest pain, syncope, dizziness, SOB, palpitations.      60 y/o male, PMHx of HTN, gout, remote sarcoidosis of the lung, alcohol abuse, and atrial fibrillation, presents today for elective AF ablation with Dr. Thurman. Pt initially diagnosed with atrial fibrillation 2/2022 in [facility] while being treated for HF exacerbation.  Pt underwent successful ADY/DCCV for atrial fibrillation while in the hospital and was discharged home on Eliquis metoprolol and flecainide. TTE revealed normal LVEF and no significant valve disease, and a stress test was negative for ischemia or infarct. ADY performed on 3/1/22 showed LVEF of 60 to 65% w/o evidence of ARSEN thrombus. Pt states he has been compliant with his Eliquis since diagnosis and reports his last dose was yesterday morning. Pt reports last PO intake was yesterday evening. Pt reports no complaints at this time. Denies chest pain, syncope, dizziness, SOB, palpitations. COVID-19 PCR negative 5/13.

## 2022-05-16 NOTE — DISCHARGE NOTE PROVIDER - NSDCCPTREATMENT_GEN_ALL_CORE_FT
PRINCIPAL PROCEDURE  Procedure: Radiofrequency ablation (RFA) of arrhythmogenic focus for atrial fibrillation  Findings and Treatment: - Bruising at the groin, sometimes extending down the leg, and/or a small lump under the skin at the groin access site is normal and will resolve within 2 – 3 weeks.   - Occasional skipped beats or palpitations that last for a few beats are common and generally resolve within 1-2 months.   - You may walk and take stairs at a regular pace.   - Do not perform any exercise more strenuous than walking for 1 week.   - Do not strain or lift heavy objects for 1 week.  - You may shower the day after the procedure.  - Do not soak in water (such as tub baths, hot tubs, swimming, etc.) for 1 week.   - You may resume all other activities the day after the procedure.  Call your doctor if:   - you notice bleeding, redness, drainage, swelling, increased tenderness or a hot sensation around the catheter insertion site.   - your temperature is greater than 100 degrees F for more than 24 hours.  - your rapid heart rhythm returns.  - you have any questions or concerns regarding the procedure.  If significant bleeding and/or a large lump (the size of a golf ball or bigger) occurs:  - Lie flat and apply continuous direct pressure just above the puncture site for at least 10 minutes  - If the issue resolves, notify your physician immediately.    - If the bleeding cannot be controlled, please seek immediate medical attention.  If you experience increased difficulty breathing or chest pain, or if you faint or have dizzy spells, please seek immediate medical attention.

## 2022-05-16 NOTE — DISCHARGE NOTE PROVIDER - HOSPITAL COURSE
61y Male h/o HTN, gout, remote sarcoidosis of the lung, alcohol abuse, and atrial fibrillation, presented 5/16/22 for and is now s/p  elective radiofrequency atrial fibrillation ablation (WACA/PVI, CTI line) access via b/l femoral veins, hemostasis achieved with figure of 8 suture b/l. 61y Male h/o HTN, gout, remote sarcoidosis of the lung, alcohol abuse, and atrial fibrillation, presented 5/16/22 for and is now s/p  elective radiofrequency atrial fibrillation ablation (WACA/PVI, CTI line) access via b/l femoral veins, hemostasis achieved with figure of 8 suture b/l. The patient was observed overnight without event and was discharged home the following morning with a plan for outpatient follow up.

## 2022-05-17 ENCOUNTER — TRANSCRIPTION ENCOUNTER (OUTPATIENT)
Age: 62
End: 2022-05-17

## 2022-05-17 VITALS
RESPIRATION RATE: 16 BRPM | SYSTOLIC BLOOD PRESSURE: 148 MMHG | HEART RATE: 76 BPM | OXYGEN SATURATION: 95 % | DIASTOLIC BLOOD PRESSURE: 71 MMHG

## 2022-05-17 LAB
ANION GAP SERPL CALC-SCNC: 18 MMOL/L — HIGH (ref 5–17)
BUN SERPL-MCNC: 26.7 MG/DL — HIGH (ref 8–20)
CALCIUM SERPL-MCNC: 8.6 MG/DL — SIGNIFICANT CHANGE UP (ref 8.6–10.2)
CHLORIDE SERPL-SCNC: 103 MMOL/L — SIGNIFICANT CHANGE UP (ref 98–107)
CO2 SERPL-SCNC: 19 MMOL/L — LOW (ref 22–29)
CREAT SERPL-MCNC: 1.45 MG/DL — HIGH (ref 0.5–1.3)
EGFR: 54 ML/MIN/1.73M2 — LOW
GLUCOSE SERPL-MCNC: 168 MG/DL — HIGH (ref 70–99)
HCT VFR BLD CALC: 35 % — LOW (ref 39–50)
HGB BLD-MCNC: 11.4 G/DL — LOW (ref 13–17)
MAGNESIUM SERPL-MCNC: 1.8 MG/DL — SIGNIFICANT CHANGE UP (ref 1.6–2.6)
MCHC RBC-ENTMCNC: 31.2 PG — SIGNIFICANT CHANGE UP (ref 27–34)
MCHC RBC-ENTMCNC: 32.6 GM/DL — SIGNIFICANT CHANGE UP (ref 32–36)
MCV RBC AUTO: 95.9 FL — SIGNIFICANT CHANGE UP (ref 80–100)
PLATELET # BLD AUTO: 301 K/UL — SIGNIFICANT CHANGE UP (ref 150–400)
POTASSIUM SERPL-MCNC: 4.3 MMOL/L — SIGNIFICANT CHANGE UP (ref 3.5–5.3)
POTASSIUM SERPL-SCNC: 4.3 MMOL/L — SIGNIFICANT CHANGE UP (ref 3.5–5.3)
RBC # BLD: 3.65 M/UL — LOW (ref 4.2–5.8)
RBC # FLD: 13.1 % — SIGNIFICANT CHANGE UP (ref 10.3–14.5)
SODIUM SERPL-SCNC: 139 MMOL/L — SIGNIFICANT CHANGE UP (ref 135–145)
WBC # BLD: 7.79 K/UL — SIGNIFICANT CHANGE UP (ref 3.8–10.5)
WBC # FLD AUTO: 7.79 K/UL — SIGNIFICANT CHANGE UP (ref 3.8–10.5)

## 2022-05-17 PROCEDURE — 93657 TX L/R ATRIAL FIB ADDL: CPT

## 2022-05-17 PROCEDURE — 93005 ELECTROCARDIOGRAM TRACING: CPT

## 2022-05-17 PROCEDURE — C1889: CPT

## 2022-05-17 PROCEDURE — 36415 COLL VENOUS BLD VENIPUNCTURE: CPT

## 2022-05-17 PROCEDURE — 85027 COMPLETE CBC AUTOMATED: CPT

## 2022-05-17 PROCEDURE — C1732: CPT

## 2022-05-17 PROCEDURE — 93656 COMPRE EP EVAL ABLTJ ATR FIB: CPT

## 2022-05-17 PROCEDURE — C1766: CPT

## 2022-05-17 PROCEDURE — 80048 BASIC METABOLIC PNL TOTAL CA: CPT

## 2022-05-17 PROCEDURE — 83735 ASSAY OF MAGNESIUM: CPT

## 2022-05-17 PROCEDURE — C1893: CPT

## 2022-05-17 PROCEDURE — C1894: CPT

## 2022-05-17 PROCEDURE — 93010 ELECTROCARDIOGRAM REPORT: CPT

## 2022-05-17 PROCEDURE — C1759: CPT

## 2022-05-17 PROCEDURE — C1730: CPT

## 2022-05-17 RX ORDER — FUROSEMIDE 40 MG
1 TABLET ORAL
Qty: 3 | Refills: 0
Start: 2022-05-17 | End: 2022-05-19

## 2022-05-17 RX ORDER — SUCRALFATE 1 G
1 TABLET ORAL
Qty: 28 | Refills: 0
Start: 2022-05-17 | End: 2022-05-30

## 2022-05-17 RX ORDER — PANTOPRAZOLE SODIUM 20 MG/1
1 TABLET, DELAYED RELEASE ORAL
Qty: 70 | Refills: 0
Start: 2022-05-17 | End: 2022-05-30

## 2022-05-17 RX ORDER — SUCRALFATE 1 G
10 TABLET ORAL
Qty: 280 | Refills: 0
Start: 2022-05-17 | End: 2022-05-30

## 2022-05-17 RX ADMIN — AMLODIPINE BESYLATE 10 MILLIGRAM(S): 2.5 TABLET ORAL at 05:27

## 2022-05-17 RX ADMIN — PANTOPRAZOLE SODIUM 40 MILLIGRAM(S): 20 TABLET, DELAYED RELEASE ORAL at 05:27

## 2022-05-17 RX ADMIN — Medication 25 MILLIGRAM(S): at 05:26

## 2022-05-17 RX ADMIN — Medication 100 MILLIGRAM(S): at 05:27

## 2022-05-17 RX ADMIN — APIXABAN 5 MILLIGRAM(S): 2.5 TABLET, FILM COATED ORAL at 05:26

## 2022-05-17 RX ADMIN — Medication 1 GRAM(S): at 05:27

## 2022-05-17 RX ADMIN — MAGNESIUM OXIDE 400 MG ORAL TABLET 400 MILLIGRAM(S): 241.3 TABLET ORAL at 07:35

## 2022-05-17 RX ADMIN — Medication 100 MILLIGRAM(S): at 05:26

## 2022-05-17 NOTE — DISCHARGE NOTE NURSING/CASE MANAGEMENT/SOCIAL WORK - PATIENT PORTAL LINK FT
You can access the FollowMyHealth Patient Portal offered by [facility] by registering at the following website: [email or web address]. By joining [facility]’s FollowMyHealth portal, you will also be able to view your health information using other applications (apps) compatible with our system.

## 2022-05-17 NOTE — PROGRESS NOTE ADULT - SUBJECTIVE AND OBJECTIVE BOX
Pt doing well POD #1 s/p Afib ablation (WACA/PVI, CTI line) access via b/l femoral veins, hemostasis achieved with figure of 8 suture b/l. Denies any complaints this morning. Labs reviewed and b/l groin sutured removed.      EKG: Pending  TELE: SR with intact conduction    MEDICATIONS  (STANDING):  allopurinol 100 milliGRAM(s) Oral two times a day  amLODIPine   Tablet 10 milliGRAM(s) Oral daily  apixaban 5 milliGRAM(s) Oral <User Schedule>  ascorbic acid 500 milliGRAM(s) Oral daily  flecainide 100 milliGRAM(s) Oral every 12 hours  magnesium oxide 400 milliGRAM(s) Oral three times a day with meals  metoprolol tartrate 25 milliGRAM(s) Oral two times a day  ondansetron Injectable 4 milliGRAM(s) IV Push once  pantoprazole    Tablet 40 milliGRAM(s) Oral every 12 hours  sodium chloride 0.9% lock flush 3 milliLiter(s) IV Push Once  sucralfate suspension 1 Gram(s) Oral every 12 hours  thiamine 100 milliGRAM(s) Oral daily    MEDICATIONS  (PRN):  acetaminophen     Tablet .. 650 milliGRAM(s) Oral every 6 hours PRN Moderate Pain (4 - 6)  ALPRAZolam 0.25 milliGRAM(s) Oral every 8 hours PRN anxiety / insomnia  aluminum hydroxide/magnesium hydroxide/simethicone Suspension 30 milliLiter(s) Oral every 4 hours PRN Dyspepsia  benzocaine 15 mG/menthol 3.6 mG Lozenge 1 Lozenge Oral every 2 hours PRN Sore Throat  oxyCODONE    IR 5 milliGRAM(s) Oral every 6 hours PRN Severe Pain (7 - 10)      Allergies    No Known Drug Allergies  Seafood (Other)        PAST MEDICAL & SURGICAL HISTORY:  Hypertension      MS (multiple sclerosis)      Hyperlipidemia      Gout      COVID-19 vaccine series completed      Alcoholism      History of atrial fibrillation      Stroke      Renal insufficiency      H/O: depression      No pertinent past surgical history          Vital Signs Last 24 Hrs  T(C): 36.6 (17 May 2022 05:20), Max: 36.6 (16 May 2022 07:40)  T(F): 97.8 (17 May 2022 05:20), Max: 97.9 (16 May 2022 07:40)  HR: 67 (17 May 2022 05:20) (61 - 70)  BP: 139/77 (17 May 2022 05:20) (126/75 - 181/87)  BP(mean): --  RR: 16 (17 May 2022 05:20) (16 - 20)  SpO2: 95% (17 May 2022 05:20) (94% - 99%)    Physical Exam:  Constitutional: NAD, AAOx3  Cardiovascular: +S1S2 RRR  Pulmonary: CTA b/l, unlabored  Abd: soft NTND +BS  Groins: C/D/I bilaterally; no bleeding, hematoma, edema  Extremities: no pedal edema, +distal pulses b/l  Neuro: non focal, PELLETIER x4    LABS:                        11.4   7.79  )-----------( 301      ( 17 May 2022 05:17 )             35.0     05-17    139  |  103  |  26.7<H>  ----------------------------<  168<H>  4.3   |  19.0<L>  |  1.45<H>    Ca    8.6      17 May 2022 05:17  Mg     1.8     05-17            Assessment:   61y Male h/o HTN, gout, remote sarcoidosis of the lung, alcohol abuse, and atrial fibrillation now s/p POD #1 elective radiofrequency atrial fibrillation ablation (WACA/PVI, CTI line)    Plan:      Resume Eliquis 5mg q12hr   Start Protonix 40mg BID x 2 weeks then daily X 6 weeks.     Start Carafate 1gm BID x 2 weeks.   Lasix 20mg PO x 3 days  Resume other home medications including Metoprolol and Flecainide.  Pt instructed as activity limitations - no lifting/pushing/pulling >10 lbs or strenuous exercise x 1 week.   Pt instructed as to access site care and f/up - written instructions included in d/c documents.  Outpt f/up in 2-4 weeks - office will contact pt to schedule.       Pt doing well POD #1 s/p Afib ablation (WACA/PVI, CTI line) access via b/l femoral veins, hemostasis achieved with figure of 8 suture b/l. Denies any complaints this morning. Labs reviewed and b/l groin sutured removed.      EKG: Sinus rhythm with intact conduction. . QRS 92  TELE: SR with intact conduction    MEDICATIONS  (STANDING):  allopurinol 100 milliGRAM(s) Oral two times a day  amLODIPine   Tablet 10 milliGRAM(s) Oral daily  apixaban 5 milliGRAM(s) Oral <User Schedule>  ascorbic acid 500 milliGRAM(s) Oral daily  flecainide 100 milliGRAM(s) Oral every 12 hours  magnesium oxide 400 milliGRAM(s) Oral three times a day with meals  metoprolol tartrate 25 milliGRAM(s) Oral two times a day  ondansetron Injectable 4 milliGRAM(s) IV Push once  pantoprazole    Tablet 40 milliGRAM(s) Oral every 12 hours  sodium chloride 0.9% lock flush 3 milliLiter(s) IV Push Once  sucralfate suspension 1 Gram(s) Oral every 12 hours  thiamine 100 milliGRAM(s) Oral daily    MEDICATIONS  (PRN):  acetaminophen     Tablet .. 650 milliGRAM(s) Oral every 6 hours PRN Moderate Pain (4 - 6)  ALPRAZolam 0.25 milliGRAM(s) Oral every 8 hours PRN anxiety / insomnia  aluminum hydroxide/magnesium hydroxide/simethicone Suspension 30 milliLiter(s) Oral every 4 hours PRN Dyspepsia  benzocaine 15 mG/menthol 3.6 mG Lozenge 1 Lozenge Oral every 2 hours PRN Sore Throat  oxyCODONE    IR 5 milliGRAM(s) Oral every 6 hours PRN Severe Pain (7 - 10)      Allergies    No Known Drug Allergies  Seafood (Other)        PAST MEDICAL & SURGICAL HISTORY:  Hypertension      MS (multiple sclerosis)      Hyperlipidemia      Gout      COVID-19 vaccine series completed      Alcoholism      History of atrial fibrillation      Stroke      Renal insufficiency      H/O: depression      No pertinent past surgical history          Vital Signs Last 24 Hrs  T(C): 36.6 (17 May 2022 05:20), Max: 36.6 (16 May 2022 07:40)  T(F): 97.8 (17 May 2022 05:20), Max: 97.9 (16 May 2022 07:40)  HR: 67 (17 May 2022 05:20) (61 - 70)  BP: 139/77 (17 May 2022 05:20) (126/75 - 181/87)  BP(mean): --  RR: 16 (17 May 2022 05:20) (16 - 20)  SpO2: 95% (17 May 2022 05:20) (94% - 99%)    Physical Exam:  Constitutional: NAD, AAOx3  Cardiovascular: +S1S2 RRR  Pulmonary: CTA b/l, unlabored  Abd: soft NTND +BS  Groins: C/D/I bilaterally; no bleeding, hematoma, edema  Extremities: no pedal edema, +distal pulses b/l  Neuro: non focal, PELLETIER x4    LABS:                        11.4   7.79  )-----------( 301      ( 17 May 2022 05:17 )             35.0     05-17    139  |  103  |  26.7<H>  ----------------------------<  168<H>  4.3   |  19.0<L>  |  1.45<H>    Ca    8.6      17 May 2022 05:17  Mg     1.8     05-17            Assessment:   61y Male h/o HTN, gout, remote sarcoidosis of the lung, alcohol abuse, and atrial fibrillation now s/p POD #1 elective radiofrequency atrial fibrillation ablation (WACA/PVI, CTI line)    Plan:      Resume Eliquis 5mg q12hr   Start Protonix 40mg BID x 2 weeks then daily X 6 weeks.     Start Carafate 1gm BID x 2 weeks.   Lasix 20mg PO x 3 days  Resume other home medications including Metoprolol and Flecainide.  Pt instructed as activity limitations - no lifting/pushing/pulling >10 lbs or strenuous exercise x 1 week.   Pt instructed as to access site care and f/up - written instructions included in d/c documents.  Outpt f/up in 2-4 weeks - office will contact pt to schedule.

## 2022-05-17 NOTE — DISCHARGE NOTE NURSING/CASE MANAGEMENT/SOCIAL WORK - NSDCPEFALRISK_GEN_ALL_CORE
For information on Fall & Injury Prevention, visit: [email or web address] OR  [email or web address] OR  https://www.cdc.gov/steadi/patient.html

## 2022-06-01 ENCOUNTER — APPOINTMENT (OUTPATIENT)
Dept: ELECTROPHYSIOLOGY | Facility: CLINIC | Age: 62
End: 2022-06-01
Payer: MEDICARE

## 2022-06-01 VITALS
WEIGHT: 234 LBS | HEIGHT: 71 IN | HEART RATE: 57 BPM | OXYGEN SATURATION: 96 % | DIASTOLIC BLOOD PRESSURE: 71 MMHG | BODY MASS INDEX: 32.76 KG/M2 | SYSTOLIC BLOOD PRESSURE: 115 MMHG | TEMPERATURE: 97.7 F

## 2022-06-01 PROCEDURE — 99214 OFFICE O/P EST MOD 30 MIN: CPT

## 2022-06-01 PROCEDURE — 93000 ELECTROCARDIOGRAM COMPLETE: CPT

## 2022-06-01 RX ORDER — BROMPHENIRAMINE/PHENYLPROPANOL 4 MG-25 MG
TABLET ORAL
Refills: 0 | Status: ACTIVE | COMMUNITY

## 2022-06-01 RX ORDER — ALLOPURINOL 100 MG/1
100 TABLET ORAL TWICE DAILY
Refills: 0 | Status: DISCONTINUED | COMMUNITY
End: 2022-06-01

## 2022-06-01 RX ORDER — FLECAINIDE ACETATE 100 MG/1
100 TABLET ORAL
Qty: 60 | Refills: 1 | Status: DISCONTINUED | COMMUNITY
Start: 2022-04-07 | End: 2022-06-01

## 2022-06-01 RX ORDER — FLECAINIDE ACETATE 100 MG/1
100 TABLET ORAL
Qty: 180 | Refills: 1 | Status: DISCONTINUED | COMMUNITY
Start: 1900-01-01 | End: 2022-06-01

## 2022-06-01 RX ORDER — METOPROLOL TARTRATE 25 MG/1
25 TABLET, FILM COATED ORAL TWICE DAILY
Qty: 180 | Refills: 2 | Status: DISCONTINUED | COMMUNITY
Start: 2022-04-06 | End: 2022-06-01

## 2022-06-01 RX ORDER — APIXABAN 5 MG/1
5 TABLET, FILM COATED ORAL
Qty: 180 | Refills: 1 | Status: DISCONTINUED | COMMUNITY
End: 2022-06-01

## 2022-06-01 RX ORDER — AMLODIPINE BESYLATE 10 MG/1
10 TABLET ORAL DAILY
Refills: 0 | Status: DISCONTINUED | COMMUNITY
End: 2022-06-01

## 2022-06-01 NOTE — REVIEW OF SYSTEMS
[Headache] : no headache [Feeling Fatigued] : not feeling fatigued [SOB] : no shortness of breath [Dyspnea on exertion] : not dyspnea during exertion [Chest Discomfort] : no chest discomfort [Palpitations] : no palpitations [Syncope] : no syncope [Dizziness] : no dizziness [Easy Bleeding] : no tendency for easy bleeding

## 2022-06-01 NOTE — HISTORY OF PRESENT ILLNESS
[FreeTextEntry1] : Mr. Carlos is a pleasant 61 year old male who presents for management of atrial fibrillation.\par \par To summarize, he was evaluated at [facility] for symptomatic new onset atrial fibrillation 2/2022. The patient also has a history of HTN, gout, remote sarcoidosis of the lung and alcohol abuse. He underwent ADY/DCCV for atrial fibrillation while in the hospital and was discharged home on eliquis, metoprolol and flecainide. TTE revealed normal LVEF and no significant valve disease, and a stress test was negative for ischemia or infarct. The patient was referred to the ED from [facility]. Prior to admission the patient reported a history of shortness of breath and dizziness of several months duration. He also has a history of syncope (last about one year ago). He was also found to have THOMAS at the time of admission. \par \par He is now s/p elective radiofrequency atrial fibrillation ablation (WACA/PVI, CTI line) on 5/17/22. \par \par Today, he reports he has been feeling well since procedure. Denies symptomatic arrhythmia recurrence. Denies pain, edema, drainage from b/l femoral access sites. ECG reveals maintained in SR.

## 2022-06-01 NOTE — DISCUSSION/SUMMARY
[FreeTextEntry1] : Mr. Carlos is a pleasant 61 year old male who presents for management of atrial fibrillation.\par \par To summarize, he was evaluated at [facility] for symptomatic new onset atrial fibrillation 2/2022. The patient also has a history of HTN, gout, remote sarcoidosis of the lung and alcohol abuse. He underwent ADY/DCCV for atrial fibrillation while in the hospital and was discharged home on eliquis, metoprolol and flecainide. TTE revealed normal LVEF and no significant valve disease, and a stress test was negative for ischemia or infarct. The patient was referred to the ED from [facility]. Prior to admission the patient reported a history of shortness of breath and dizziness of several months duration. He also has a history of syncope (last about one year ago). He was also found to have THOMAS at the time of admission. \par \par He is now s/p elective radiofrequency atrial fibrillation ablation (WACA/PVI, CTI line) on 5/17/22. \par \par Today, he reports he has been feeling well since procedure. Denies symptomatic arrhythmia recurrence. Denies pain, edema, drainage from b/l femoral access sites. ECG reveals maintained in SR. \par \par Recommendation:\par \par Mr. Carlos is doing well s/p AF ablation with no symptomatic recurrence maintained in SR on ECG. He may stop flecainide one month post procedure 6/17/22. To continue Eliquis for stroke prophylaxis and metoprolol. Return for EP follow up in 3 months with 1 week [facility] prior for arrhythmia surveillance.\par \par Aparna VALENTINOC

## 2022-07-12 ENCOUNTER — NON-APPOINTMENT (OUTPATIENT)
Age: 62
End: 2022-07-12

## 2022-07-12 ENCOUNTER — APPOINTMENT (OUTPATIENT)
Dept: CARDIOLOGY | Facility: CLINIC | Age: 62
End: 2022-07-12

## 2022-07-12 VITALS
SYSTOLIC BLOOD PRESSURE: 118 MMHG | OXYGEN SATURATION: 95 % | HEIGHT: 71 IN | WEIGHT: 235 LBS | HEART RATE: 57 BPM | DIASTOLIC BLOOD PRESSURE: 68 MMHG | BODY MASS INDEX: 32.9 KG/M2

## 2022-07-12 DIAGNOSIS — M79.89 OTHER SPECIFIED SOFT TISSUE DISORDERS: ICD-10-CM

## 2022-07-12 PROCEDURE — 99214 OFFICE O/P EST MOD 30 MIN: CPT

## 2022-07-12 NOTE — DISCUSSION/SUMMARY
[FreeTextEntry1] :  overall the patient is doing amazingly well and remains in sinus rhythm.  He continues to abstain from alcohol.  He is also been followed by electrophysiology.\par \par   I think in the future we can consider stopping the Eliquis if he remains in sinus rhythm as it is likely that the A. fib was precipitated by alcohol.

## 2022-07-12 NOTE — HISTORY OF PRESENT ILLNESS
[FreeTextEntry1] : Jacob is 61 years old with a history of gout, dormant sarcoidosis borderline diabetes and a history of some intermittent renal insufficiency.  I had done a noninvasive cardiac work-up in the past which my recollection was that it was unremarkable\par \par He recently has been drinking heavy and admitted himself to [facility] for detoxification.  He was noted to be in atrial fibrillation.  He underwent a ADY DC cardioversion to sinus rhythm and was placed on metoprolol 25 twice daily flecainide 100 twice daily and magnesium folic acid along with his allopurinol and of course started on Eliquis 5 twice daily\par \par He has now been on alpha alcohol for some time and feels well.\par \par Blood tests in the hospital\par Hemoglobin 10.6 hematocrit 32.5\par Calcium 9.68 potassium 3.93 BUN 30.5 creatinine 1.56 sodium 147 GFR 46\par Chest x-ray PA and lateral mild diffuse increased interstitial markings in both lung fields no active infiltrate mild uncoiling of the aorta you can see\par \par Presently he is feeling well without palpitations dizziness chest pain or shortness of breath.  He continues to abstain from alcohol.  He is in excellent spirits.  He denies palpitations edema orthopnea PND\par \par EKG March 8, 2022 normal sinus rhythm RSR prime in V1 otherwise is within normal limits\par \par Last visit he was feeling well without palpitations shortness of breath or chest pain.  He recently developed a rash on his head with reddish papules and some degree of pruritus and headache.  He stopped flecainide but continues on his other medications.\par \par   ECG June 2022 normal sinus rhythm within normal limits\par \par  the patient continues to abstain from alcohol and other than gaining weight he feels well without shortness of breath chest pain or palpitations dizziness or syncope\par \par  his medications are stable including allopurinol 100 amlodipine 10 CoQ10 Eliquis 5 twice daily folic acid metoprolol 25 twice a day furosemide 20 pantoprazole\par \par He has not had any alcohol now for some time and continues to abstain

## 2022-07-12 NOTE — ASSESSMENT
[FreeTextEntry1] : Josh Carlos is 61 years old with a prior history of borderline diabetes hypertension alcohol abuse history of intermittent renal insufficiency and gout.  During detoxification of his alcohol abuse he was noted to be in A. fib and underwent successful cardioversion.  He presently is in normal sinus rhythm and asymptomatic with a normal EKG.  He is on metoprolol  Eliquis amlodipine and low-dose Lasix\par \par 1.  Alcoholism seems to be under good control he now has abstained for over a month\par \par 2.  Paroxysmal atrial fibrillation patient presently is in normal sinus rhythm with a normal EKG.  Patient remains in normal sinus rhythm today.  He is no longer on flecainide and only on metoprolol\par \par 3.  No evidence of CHF or angina previous noninvasive work-up was negative several years ago\par \par 4 history of gout no recent episodes of gout has been on allopurinol\par \par 5.  Renal insufficiency last creatinine approximately 1.5 in the hospital.\par \par

## 2022-08-22 NOTE — ED PROVIDER NOTE - MUSCULOSKELETAL NECK EXAM
DISPLAY PLAN FREE TEXT
no deformity, pain or tenderness. no restriction of movement/no midline tenderness or pain with movement

## 2022-09-14 ENCOUNTER — APPOINTMENT (OUTPATIENT)
Dept: ELECTROPHYSIOLOGY | Facility: CLINIC | Age: 62
End: 2022-09-14

## 2022-09-19 ENCOUNTER — RX RENEWAL (OUTPATIENT)
Age: 62
End: 2022-09-19

## 2022-11-08 ENCOUNTER — APPOINTMENT (OUTPATIENT)
Dept: CARDIOLOGY | Facility: CLINIC | Age: 62
End: 2022-11-08

## 2022-11-08 VITALS
SYSTOLIC BLOOD PRESSURE: 120 MMHG | DIASTOLIC BLOOD PRESSURE: 82 MMHG | BODY MASS INDEX: 30.54 KG/M2 | WEIGHT: 219 LBS | OXYGEN SATURATION: 97 % | HEART RATE: 88 BPM

## 2022-11-08 DIAGNOSIS — R06.02 SHORTNESS OF BREATH: ICD-10-CM

## 2022-11-08 PROCEDURE — 99214 OFFICE O/P EST MOD 30 MIN: CPT

## 2022-11-08 NOTE — DISCUSSION/SUMMARY
[FreeTextEntry1] :  1.  I told him to stop amlodipine as his blood pressure is low and this is probably the cause of his dizziness and exacerbating some degree of orthostatic hypotension\par \par  2.  Continue other medications including the metoprolol and Eliquis\par \par  3.  Patient has a appointment with neurology though I do not feel this is a neurologic issue.

## 2022-11-08 NOTE — ASSESSMENT
[FreeTextEntry1] : Josh Carlos is 61 years old with a prior history of borderline diabetes hypertension alcohol abuse history of intermittent renal insufficiency and gout.  During detoxification of his alcohol abuse he was noted to be in A. fib and underwent successful cardioversion.  He presently is in normal sinus rhythm and asymptomatic with a normal EKG.  He is on metoprolol  Eliquis amlodipine and low-dose Lasix.  Presently his main complaint is dizziness and some shortness of breath.  He does have an element of orthostatic hypotension and his blood pressure is on the lower side.  I am not certain as to why he is short of breath as he has lost weight and remains in normal sinus rhythm\par \par 1.  Alcoholism seems to be under good control he now has abstained for over a month\par \par 2.  Paroxysmal atrial fibrillation patient presently is in normal sinus rhythm with a normal EKG.  Patient remains in normal sinus rhythm today.  He is no longer on flecainide and only on metoprolol.  Holter monitor in July normal sinus rhythm no A. fib\par \par 3.   positional and exertional dizziness etiology unclear but blood pressure is quite low and he does have some degree of orthostatic hypotension.  He is on amlodipine 10 and Lasix.\par \par 4 history of gout no recent episodes of gout has been on allopurinol\par \par 5.  Renal insufficiency last creatinine approximately 1.5 in the hospital.\par \par \par 6.  Exertional shortness of breath etiology unclear as he has lost weight and previous noninvasive cardiac work-up including stress testing and echoes have been unremarkable\par \par

## 2022-11-08 NOTE — HISTORY OF PRESENT ILLNESS
[FreeTextEntry1] : Jacob is 61 years old with a history of gout, dormant sarcoidosis borderline diabetes and a history of some intermittent renal insufficiency.  I had done a noninvasive cardiac work-up in the past which my recollection was that it was unremarkable\par \par He recently has been drinking heavy and admitted himself to [facility] for detoxification.  He was noted to be in atrial fibrillation.  He underwent a ADY DC cardioversion to sinus rhythm and was placed on metoprolol 25 twice daily flecainide 100 twice daily and magnesium folic acid along with his allopurinol and of course started on Eliquis 5 twice daily\par \par He has now been on alpha alcohol for some time and feels well.\par \par Blood tests in the hospital\par Hemoglobin 10.6 hematocrit 32.5\par Calcium 9.68 potassium 3.93 BUN 30.5 creatinine 1.56 sodium 147 GFR 46\par Chest x-ray PA and lateral mild diffuse increased interstitial markings in both lung fields no active infiltrate mild uncoiling of the aorta you can see\par \par Presently he is feeling well without palpitations dizziness chest pain or shortness of breath.  He continues to abstain from alcohol.  He is in excellent spirits.  He denies palpitations edema orthopnea PND\par \par EKG March 8, 2022 normal sinus rhythm RSR prime in V1 otherwise is within normal limits\par \par Last visit he was feeling well without palpitations shortness of breath or chest pain.  He recently developed a rash on his head with reddish papules and some degree of pruritus and headache.  He stopped flecainide but continues on his other medications.\par \par   ECG June 2022 normal sinus rhythm within normal limits\par \par  the patient continues to abstain from alcohol and other than gaining weight he feels well without shortness of breath chest pain or palpitations dizziness or syncope\par \par  his medications are stable including allopurinol 100 amlodipine 10 CoQ10 Eliquis 5 twice daily folic acid metoprolol 25 twice a day furosemide 20 pantoprazole\par \par He has not had any alcohol now for some time and continues to abstain\par \par  visit November 8, 2022: Patient recently has been complaining of dizziness.  He went to urgent care who felt he should see neurology because concern about carotid disease.  However the patient is very clear that he gets mostly dizzy when he changes position and also with walking..  He also complains of some intermittent shortness of breath.  He has lost 20 pounds.  Holter monitor in July 2022 revealed no evidence of atrial fibrillation or any other arrhythmia.
No

## 2022-11-08 NOTE — PHYSICAL EXAM
[Well Developed] : well developed [Well Nourished] : well nourished [No Acute Distress] : no acute distress [Normal Conjunctiva] : normal conjunctiva [No Carotid Bruit] : no carotid bruit [Normal S1, S2] : normal S1, S2 [No Murmur] : no murmur [Clear Lung Fields] : clear lung fields [Soft] : abdomen soft [Non Tender] : non-tender [No Edema] : no edema [de-identified] :   Patient has  mild orthostatic hypotension with blood pressure going from 120/70 to 100/70 when he stands up [de-identified] : No rales rhonchi or wheezes [de-identified] :  protuberant abdomen

## 2022-11-09 ENCOUNTER — APPOINTMENT (OUTPATIENT)
Dept: ELECTROPHYSIOLOGY | Facility: CLINIC | Age: 62
End: 2022-11-09

## 2022-11-09 ENCOUNTER — NON-APPOINTMENT (OUTPATIENT)
Age: 62
End: 2022-11-09

## 2022-11-09 VITALS
HEIGHT: 71 IN | HEART RATE: 79 BPM | TEMPERATURE: 97.7 F | WEIGHT: 221 LBS | DIASTOLIC BLOOD PRESSURE: 80 MMHG | BODY MASS INDEX: 30.94 KG/M2 | OXYGEN SATURATION: 97 % | SYSTOLIC BLOOD PRESSURE: 122 MMHG

## 2022-11-09 PROCEDURE — 99213 OFFICE O/P EST LOW 20 MIN: CPT

## 2022-11-09 PROCEDURE — 93000 ELECTROCARDIOGRAM COMPLETE: CPT

## 2022-11-09 RX ORDER — AMLODIPINE BESYLATE 10 MG/1
10 TABLET ORAL DAILY
Qty: 90 | Refills: 0 | Status: DISCONTINUED | COMMUNITY
Start: 2018-01-19 | End: 2022-11-09

## 2022-11-09 RX ORDER — PANTOPRAZOLE 40 MG/1
40 TABLET, DELAYED RELEASE ORAL
Qty: 70 | Refills: 0 | Status: DISCONTINUED | COMMUNITY
Start: 2022-05-17 | End: 2022-11-09

## 2022-11-09 RX ORDER — FUROSEMIDE 20 MG/1
20 TABLET ORAL
Qty: 3 | Refills: 0 | Status: DISCONTINUED | COMMUNITY
Start: 2022-05-17 | End: 2022-11-09

## 2022-11-09 RX ORDER — SUCRALFATE 1 G/1
1 TABLET ORAL
Qty: 28 | Refills: 0 | Status: DISCONTINUED | COMMUNITY
Start: 2022-05-17 | End: 2022-11-09

## 2022-11-09 RX ORDER — ELECTROLYTES/DEXTROSE
SOLUTION, ORAL ORAL DAILY
Refills: 0 | Status: DISCONTINUED | COMMUNITY
Start: 2022-04-12 | End: 2022-11-09

## 2022-11-09 RX ORDER — FOLIC ACID 1 MG/1
1 TABLET ORAL
Qty: 30 | Refills: 0 | Status: DISCONTINUED | COMMUNITY
Start: 2022-04-12 | End: 2022-11-09

## 2022-11-09 RX ORDER — UBIDECARENONE 200 MG
CAPSULE ORAL
Refills: 0 | Status: DISCONTINUED | COMMUNITY
End: 2022-11-09

## 2022-11-09 RX ORDER — L.ACID,FERM,PLA,RHA/B.BIF,LONG 126 MG
TABLET, DELAYED AND EXTENDED RELEASE ORAL
Refills: 0 | Status: DISCONTINUED | COMMUNITY
End: 2022-11-09

## 2022-11-09 RX ORDER — METOPROLOL TARTRATE 25 MG/1
25 TABLET, FILM COATED ORAL
Refills: 0 | Status: DISCONTINUED | COMMUNITY
Start: 2022-11-08 | End: 2022-11-09

## 2022-11-09 NOTE — HISTORY OF PRESENT ILLNESS
[FreeTextEntry1] : Mr. Carlos is a pleasant 61 year old male here for follow up today. He has a history of HTN, gout, remote sarcoidosis of the lung and alcohol abuse. \par \par To summarize, the patient was diagnosed with new onset symptomatic atrial fibrillation during a hospital admission early this year . He underwent ADY/DCCV for atrial fibrillation while in the hospital and was discharged home on eliquis, metoprolol and flecainide. TTE revealed normal LVEF and no significant valve disease, and a stress test was negative for ischemia or infarct. The patient was referred to the ED from [facility]. Prior to admission the patient reported a history of shortness of breath and dizziness of several months duration. He also has a history of syncope (last about one year ago). He was also found to have THOMAS at the time of admission. He underwent catheter ablation for atrial fibrillation on 5/16/22 (PVI + CTI). He reports to be doing well since with no recurrent symptoms of AF. He wore a one week event monitor in 8/2022 which did not reveal any evidence of atrial fibrillation. The patient is currently taking metoprolol and eliquis which he is tolerating well. The patient states that he was taken off amlodipine because of orthostatic dizziness. He also notes some shortness of breath with activity.

## 2022-11-09 NOTE — DISCUSSION/SUMMARY
[FreeTextEntry1] : In summary, Mr. Carlos is a 62 year old male with symptomatic atrial fibrillation s/p RFA, HTN, alcohol use, and gout. He underwent ablation for AF in 5/2022 and has done well since then in terms of maintaining sinus rhythm. Outpatient event monitor was negative for any arrhythmias. He is currently on eliquis and metoprolol which he will continue. He will return for follow up in 6 months or earlier if needed.

## 2022-12-19 NOTE — ED ADULT NURSE NOTE - SUICIDE SCREENING QUESTION 2
----- Message from Natalie Solano sent at 12/19/2022  1:07 PM CST -----  Regarding: call back  Contact: [facility] [phone]  Type:  Pharmacy Calling to Clarify an RX    Name of Caller: Pino   Pharmacy Name: [facility] [phone]  Prescription Name: doxycycline (VIBRAMYCIN) 50 mg/5 mL Syrp 473 mL   What do they need to clarify?: medication is on back order contact pharmacy   Best Call Back Number:   Additional Information:          
Medication listed below is on back order is there an alternative?  
Spoke to patient and notified him that an alternative was sent to pharmacy  
No

## 2023-02-07 ENCOUNTER — APPOINTMENT (OUTPATIENT)
Dept: CARDIOLOGY | Facility: CLINIC | Age: 63
End: 2023-02-07

## 2023-04-10 ENCOUNTER — NON-APPOINTMENT (OUTPATIENT)
Age: 63
End: 2023-04-10

## 2023-05-10 ENCOUNTER — APPOINTMENT (OUTPATIENT)
Dept: ELECTROPHYSIOLOGY | Facility: CLINIC | Age: 63
End: 2023-05-10

## 2023-05-17 ENCOUNTER — LABORATORY RESULT (OUTPATIENT)
Age: 63
End: 2023-05-17

## 2023-05-17 ENCOUNTER — APPOINTMENT (OUTPATIENT)
Dept: INTERNAL MEDICINE | Facility: CLINIC | Age: 63
End: 2023-05-17
Payer: MEDICARE

## 2023-05-17 ENCOUNTER — NON-APPOINTMENT (OUTPATIENT)
Age: 63
End: 2023-05-17

## 2023-05-17 VITALS
DIASTOLIC BLOOD PRESSURE: 65 MMHG | HEART RATE: 72 BPM | BODY MASS INDEX: 28.98 KG/M2 | WEIGHT: 207 LBS | SYSTOLIC BLOOD PRESSURE: 100 MMHG | HEIGHT: 71 IN

## 2023-05-17 DIAGNOSIS — Z00.00 ENCOUNTER FOR GENERAL ADULT MEDICAL EXAMINATION W/OUT ABNORMAL FINDINGS: ICD-10-CM

## 2023-05-17 DIAGNOSIS — Z87.891 PERSONAL HISTORY OF NICOTINE DEPENDENCE: ICD-10-CM

## 2023-05-17 PROCEDURE — 36415 COLL VENOUS BLD VENIPUNCTURE: CPT

## 2023-05-17 PROCEDURE — 99386 PREV VISIT NEW AGE 40-64: CPT | Mod: 25

## 2023-05-17 PROCEDURE — 93000 ELECTROCARDIOGRAM COMPLETE: CPT | Mod: 59

## 2023-05-17 NOTE — ASSESSMENT
[FreeTextEntry1] : 62 year old male with h/o PAF s/p cardioversion on eliquis, gout, hypertension, renal insufficiency presents for initial annual exam.\par \par Dizziness, can be related to orthostatic hypotension.  Blood pressure on the lower end today.  Never stopped amlodipine.  Was seen by your neurologist.  Awaiting results of the MRI\par -Stop amlodipine as directed\par -Advised to have neurology fax over results to review\par -Remain well-hydrated throughout the day\par \par PAF s/p cardioversion on eliquis, \par -cont eliquis, metoprolol\par -f/u with cardio, EP \par \par Gout, controlled\par -cont allopurinol \par -Check uric acid\par \par hypertension, \par -Continue metoprolol\par -Stop amlodipine\par \par Renal insufficiency, baseline Cr 1.2- 1.4, Can be related to hypotensive, allopurinol.\par -Repeat level\par -Stop amlodipine\par \par h/o alcohol abuse.  In remission the last 14 months\par -Continue abstaining from alcohol\par -Continue attending AA meetings\par \par Healthcare Maintenance\par -Advise Yearly Skin cancer screening with Dermatologist \par -Advise Yearly Eye exam with Ophthalmologist\par -Advise Yearly Dental exam\par -Educated of the importance of Healthy diet, such as Mediterranean Diet and Exercise, such as walking >20 minutes a day and increasing gradually as tolerated\par \par Immunizations\par -Flu vaccine \par -Covid vaccine \par \par Preventative screening \par -advised to get colonoscopy for colon cancer screening -referral given \par -will check PSA for prostate cancer screening -labs drawn \par \par Follow-up in 6 months\par \par \par \par \par

## 2023-05-17 NOTE — HEALTH RISK ASSESSMENT
[Good] : ~his/her~  mood as  good [No falls in past year] : Patient reported no falls in the past year [0] : 2) Feeling down, depressed, or hopeless: Not at all (0) [PHQ-2 Negative - No further assessment needed] : PHQ-2 Negative - No further assessment needed [Fully functional (bathing, dressing, toileting, transferring, walking, feeding)] : Fully functional (bathing, dressing, toileting, transferring, walking, feeding) [Fully functional (using the telephone, shopping, preparing meals, housekeeping, doing laundry, using] : Fully functional and needs no help or supervision to perform IADLs (using the telephone, shopping, preparing meals, housekeeping, doing laundry, using transportation, managing medications and managing finances) [Former] : Former [[id number]] : 0 [Change in mental status noted] : No change in mental status noted [Reports changes in hearing] : Reports no changes in hearing [Reports changes in vision] : Reports no changes in vision [ColonoscopyDate] : 10 years

## 2023-05-17 NOTE — HISTORY OF PRESENT ILLNESS
[de-identified] : 62 year old male presents for initial annual exam.\par \par dizziness upon sitting up from prone position.  \par

## 2023-05-19 ENCOUNTER — APPOINTMENT (OUTPATIENT)
Dept: CARDIOLOGY | Facility: CLINIC | Age: 63
End: 2023-05-19
Payer: MEDICARE

## 2023-05-19 ENCOUNTER — NON-APPOINTMENT (OUTPATIENT)
Age: 63
End: 2023-05-19

## 2023-05-19 VITALS
DIASTOLIC BLOOD PRESSURE: 60 MMHG | HEIGHT: 71 IN | OXYGEN SATURATION: 98 % | WEIGHT: 207 LBS | BODY MASS INDEX: 28.98 KG/M2 | SYSTOLIC BLOOD PRESSURE: 118 MMHG | HEART RATE: 77 BPM

## 2023-05-19 DIAGNOSIS — G56.03 CARPAL TUNNEL SYNDROM,BILATERAL UPPER LIMBS: ICD-10-CM

## 2023-05-19 DIAGNOSIS — F32.A DEPRESSION, UNSPECIFIED: ICD-10-CM

## 2023-05-19 PROCEDURE — 93000 ELECTROCARDIOGRAM COMPLETE: CPT

## 2023-05-19 PROCEDURE — 99214 OFFICE O/P EST MOD 30 MIN: CPT

## 2023-05-19 RX ORDER — METOPROLOL TARTRATE 25 MG/1
25 TABLET, FILM COATED ORAL TWICE DAILY
Refills: 0 | Status: DISCONTINUED | COMMUNITY
End: 2023-05-19

## 2023-05-19 NOTE — DISCUSSION/SUMMARY
[FreeTextEntry1] :  1.  The patient has improved and is no longer drinking and has lost a significant amount of weight.  His cardiac status is stable\par \par  2.  He is anemic etiology unclear with a high ferritin normal iron stores.  This could explain some of his shortness of breath\par \par  3.  Dizziness there is some element of orthostatic hypotension and he should keep well-hydrated\par \par  I will change his metoprolol from metoprolol tartrate 25 twice daily to Toprol-XL 25\par  in the future we can consider stopping the Eliquis as he is remained in sinus rhythm and A-fib was probably generated by his alcohol excess.\par \par   Routine follow-up again in 3 [EKG obtained to assist in diagnosis and management of assessed problem(s)] : EKG obtained to assist in diagnosis and management of assessed problem(s)

## 2023-05-19 NOTE — HISTORY OF PRESENT ILLNESS
[FreeTextEntry1] : Jacob is 61 years old with a history of gout, dormant sarcoidosis borderline diabetes and a history of some intermittent renal insufficiency.  I had done a noninvasive cardiac work-up in the past which my recollection was that it was unremarkable\par \par He recently has been drinking heavy and admitted himself to [facility] for detoxification.  He was noted to be in atrial fibrillation.  He underwent a ADY DC cardioversion to sinus rhythm and was placed on metoprolol 25 twice daily flecainide 100 twice daily and magnesium folic acid along with his allopurinol and of course started on Eliquis 5 twice daily\par \par He has now been on off alcohol for some time and feels well.\par \par Blood tests in the hospital\par Hemoglobin 10.6 hematocrit 32.5\par Calcium 9.68 potassium 3.93 BUN 30.5 creatinine 1.56 sodium 147 GFR 46\par Chest x-ray PA and lateral mild diffuse increased interstitial markings in both lung fields no active infiltrate mild uncoiling of the aorta you can see\par \par Presently he is feeling well without palpitations dizziness chest pain or shortness of breath.  He continues to abstain from alcohol.  He is in excellent spirits.  He denies palpitations edema orthopnea PND\par \par EKG March 8, 2022 normal sinus rhythm RSR prime in V1 otherwise is within normal limits\par \par Last visit he was feeling well without palpitations shortness of breath or chest pain.  He recently developed a rash on his head with reddish papules and some degree of pruritus and headache.  He stopped flecainide but continues on his other medications.\par \par   ECG June 2022 normal sinus rhythm within normal limits\par \par  the patient continues to abstain from alcohol and other than gaining weight he feels well without shortness of breath chest pain or palpitations dizziness or syncope\par \par  his medications are stable including allopurinol 100 amlodipine 10 CoQ10 Eliquis 5 twice daily folic acid metoprolol 25 twice a day furosemide 20 pantoprazole\par \par He has not had any alcohol now for some time and continues to abstain\par \par  visit November 8, 2022: Patient recently has been complaining of dizziness.  He went to urgent care who felt he should see neurology because concern about carotid disease.  However the patient is very clear that he gets mostly dizzy when he changes position and also with walking..  He also complains of some intermittent shortness of breath.  He has lost 20 pounds.  Holter monitor in July 2022 revealed no evidence of atrial fibrillation or any other arrhythmia.\par \par  visit May 19, 2023: Overall the patient is feeling well but does get some intermittent palpitations when he walks.  He also complains of dizziness sometimes when he is just lying flat but often when he changes position quickly\par \par  EKG May 1923 normal sinus rhythm sinus bradycardia within normal limit\par  blood pressure at rest lying feeling dizzy 115/70 when he stood up for 3 minutes was 100/70 pulse remained at 6\par \par  recent blood work hemoglobin 9.6 hematocrit 31.7 hemoglobin A1c 4.9 TSH 4.62 LDL 86 HDL 42 cholesterol 147 creatinine 1.40 GFR 56\par  B12 and folate normal\par  ferritin 843 which is high serum iron 46 total binding capacity 218% saturation 21%

## 2023-05-19 NOTE — PHYSICAL EXAM
[Well Developed] : well developed [Well Nourished] : well nourished [No Acute Distress] : no acute distress [Normal Conjunctiva] : normal conjunctiva [No Carotid Bruit] : no carotid bruit [Normal S1, S2] : normal S1, S2 [No Murmur] : no murmur [Clear Lung Fields] : clear lung fields [Soft] : abdomen soft [Non Tender] : non-tender [No Edema] : no edema [de-identified] :   Patient has  mild orthostatic hypotension with blood pressure going from 120/70 to 100/70 when he stands up [de-identified] : No rales rhonchi or wheezes [de-identified] :  protuberant abdomen

## 2023-05-19 NOTE — REASON FOR VISIT
[FreeTextEntry1] : Jacob eddy 62 years old here for follow-up of his cardiac  status.  I last saw him in November 2022

## 2023-05-19 NOTE — ASSESSMENT
[FreeTextEntry1] : Josh Carlos is 61 years old with a prior history of borderline diabetes hypertension alcohol abuse history of intermittent renal insufficiency and gout.  During detoxification of his alcohol abuse he was noted to be in A. fib and underwent successful cardioversion.  He presently is in normal sinus rhythm and asymptomatic with a normal EKG.  He is on metoprolol  Eliquis amlodipine and low-dose Lasix.  Presently his main complaint is dizziness and some shortness of breath.  He does have an element of orthostatic hypotension and his blood pressure is on the lower side.  I am not certain as to why he is short of breath as he has lost weight and remains in normal sinus rhythm.  The shortness of breath continues and the intermittent dizziness also continues\par \par 1.  Alcoholism seems to be under good control he now has abstained for over a month\par \par 2.  Paroxysmal atrial fibrillation patient presently is in normal sinus rhythm with a normal EKG.  Patient remains in normal sinus rhythm today.  He is no longer on flecainide and only on metoprolol.  Holter monitor in July normal sinus rhythm no A. fib\par \par 3.   positional and exertional dizziness etiology unclear but blood pressure is quite low and he does have some degree of orthostatic hypotension.   he is on metoprolol 25 twice daily\par 4 history of gout no recent episodes of gout has been on allopurinol\par \par 5.  Renal insufficiency last creatinine approximately 1.4 i May 2023 GFR 56\par \par \par 6.  Exertional shortness of breath etiology unclear as he has lost weight and previous noninvasive cardiac work-up including stress testing and echoes have been unremarkable\par \par

## 2023-05-31 ENCOUNTER — CLINICAL ADVICE (OUTPATIENT)
Age: 63
End: 2023-05-31

## 2023-05-31 ENCOUNTER — APPOINTMENT (OUTPATIENT)
Dept: ELECTROPHYSIOLOGY | Facility: CLINIC | Age: 63
End: 2023-05-31

## 2023-05-31 DIAGNOSIS — N39.0 URINARY TRACT INFECTION, SITE NOT SPECIFIED: ICD-10-CM

## 2023-05-31 LAB
25(OH)D3 SERPL-MCNC: 43.8 NG/ML
ALBUMIN SERPL ELPH-MCNC: 3.3 G/DL
ALP BLD-CCNC: 99 U/L
ALT SERPL-CCNC: 20 U/L
ANION GAP SERPL CALC-SCNC: 14 MMOL/L
APPEARANCE: ABNORMAL
AST SERPL-CCNC: 33 U/L
BACTERIA: ABNORMAL /HPF
BASOPHILS # BLD AUTO: 0.05 K/UL
BASOPHILS NFR BLD AUTO: 0.7 %
BILIRUB DIRECT SERPL-MCNC: 0.2 MG/DL
BILIRUB INDIRECT SERPL-MCNC: 0.2 MG/DL
BILIRUB SERPL-MCNC: 0.3 MG/DL
BILIRUBIN URINE: NEGATIVE
BLOOD URINE: NEGATIVE
BUN SERPL-MCNC: 27 MG/DL
CALCIUM SERPL-MCNC: 9.4 MG/DL
CAST: 5 /LPF
CHLORIDE SERPL-SCNC: 107 MMOL/L
CHOLEST SERPL-MCNC: 147 MG/DL
CO2 SERPL-SCNC: 22 MMOL/L
COLOR: NORMAL
CREAT SERPL-MCNC: 1.4 MG/DL
EGFR: 56 ML/MIN/1.73M2
EOSINOPHIL # BLD AUTO: 0.13 K/UL
EOSINOPHIL NFR BLD AUTO: 1.7 %
EPITHELIAL CELLS: 6 /HPF
ESTIMATED AVERAGE GLUCOSE: 94 MG/DL
FERRITIN SERPL-MCNC: 843 NG/ML
FOLATE SERPL-MCNC: 18.1 NG/ML
GLUCOSE QUALITATIVE U: NEGATIVE MG/DL
GLUCOSE SERPL-MCNC: 85 MG/DL
HBA1C MFR BLD HPLC: 4.9 %
HCT VFR BLD CALC: 31.7 %
HCV AB SER QL: NONREACTIVE
HCV S/CO RATIO: 0.1 S/CO
HDLC SERPL-MCNC: 42 MG/DL
HGB BLD-MCNC: 9.6 G/DL
HIV1+2 AB SPEC QL IA.RAPID: NONREACTIVE
HYALINE CASTS: PRESENT
IMM GRANULOCYTES NFR BLD AUTO: 0.7 %
IRON SATN MFR SERPL: 21 %
IRON SERPL-MCNC: 46 UG/DL
KETONES URINE: ABNORMAL MG/DL
LDLC SERPL CALC-MCNC: 86 MG/DL
LEUKOCYTE ESTERASE URINE: ABNORMAL
LYMPHOCYTES # BLD AUTO: 1.57 K/UL
LYMPHOCYTES NFR BLD AUTO: 20.9 %
MAN DIFF?: NORMAL
MCHC RBC-ENTMCNC: 30.3 GM/DL
MCHC RBC-ENTMCNC: 32.1 PG
MCV RBC AUTO: 106 FL
MICROSCOPIC-UA: NORMAL
MONOCYTES # BLD AUTO: 0.52 K/UL
MONOCYTES NFR BLD AUTO: 6.9 %
NEUTROPHILS # BLD AUTO: 5.19 K/UL
NEUTROPHILS NFR BLD AUTO: 69.1 %
NITRITE URINE: POSITIVE
NONHDLC SERPL-MCNC: 104 MG/DL
PH URINE: 5.5
PLATELET # BLD AUTO: 520 K/UL
POTASSIUM SERPL-SCNC: 4.4 MMOL/L
PROT SERPL-MCNC: 7.6 G/DL
PROTEIN URINE: 100 MG/DL
PSA SERPL-MCNC: 2.17 NG/ML
RBC # BLD: 2.99 M/UL
RBC # FLD: 13.2 %
RED BLOOD CELLS URINE: 1 /HPF
REVIEW: NORMAL
SODIUM SERPL-SCNC: 143 MMOL/L
SPECIFIC GRAVITY URINE: 1.02
TIBC SERPL-MCNC: 218 UG/DL
TRIGL SERPL-MCNC: 93 MG/DL
TSH SERPL-ACNC: 4.62 UIU/ML
UIBC SERPL-MCNC: 171 UG/DL
URATE SERPL-MCNC: 6.5 MG/DL
UROBILINOGEN URINE: 0.2 MG/DL
VIT B12 SERPL-MCNC: 754 PG/ML
WBC # FLD AUTO: 7.51 K/UL
WBC CLUMPS: PRESENT
WHITE BLOOD CELLS URINE: 72 /HPF

## 2023-06-28 ENCOUNTER — APPOINTMENT (OUTPATIENT)
Dept: ELECTROPHYSIOLOGY | Facility: CLINIC | Age: 63
End: 2023-06-28
Payer: MEDICARE

## 2023-06-28 VITALS
HEART RATE: 48 BPM | WEIGHT: 207 LBS | OXYGEN SATURATION: 98 % | HEIGHT: 71 IN | TEMPERATURE: 98 F | SYSTOLIC BLOOD PRESSURE: 118 MMHG | BODY MASS INDEX: 28.98 KG/M2 | DIASTOLIC BLOOD PRESSURE: 64 MMHG

## 2023-06-28 PROCEDURE — 93000 ELECTROCARDIOGRAM COMPLETE: CPT

## 2023-06-28 PROCEDURE — 99214 OFFICE O/P EST MOD 30 MIN: CPT

## 2023-06-28 NOTE — HISTORY OF PRESENT ILLNESS
[FreeTextEntry1] : Mr. Carlos is a pleasant 61 year old male here for follow up today. He has a history of HTN, gout, remote sarcoidosis of the lung, alcohol abuse, and cerebellar ataxia.\par \par To summarize, the patient was diagnosed with new onset symptomatic atrial fibrillation during a hospital admission in 2022 . He underwent ADY/DCCV for atrial fibrillation while in the hospital and was discharged home on eliquis, metoprolol and flecainide. TTE revealed normal LVEF and no significant valve disease, and a stress test was negative for ischemia or infarct. The patient was referred to the ED from [facility]. Prior to admission the patient reported a history of shortness of breath and dizziness of several months duration. He underwent catheter ablation for atrial fibrillation on 5/16/22 (PVI + CTI). He reports to be doing well since with no recurrent symptoms of AF. He wore a one week event monitor in 8/2022 which did not reveal any evidence of atrial fibrillation. The patient is currently taking metoprolol and eliquis which he is tolerating well. \par

## 2023-06-28 NOTE — DISCUSSION/SUMMARY
[FreeTextEntry1] : In summary, Mr. Carlos is a 62 year old male with symptomatic atrial fibrillation s/p RFA, HTN, alcohol use, cerebellar ataxia and gout. He underwent ablation for AF in 5/2022 and has done well since then in terms of maintaining sinus rhythm. Outpatient event monitor was negative for any arrhythmias. He is currently on eliquis and metoprolol which he will continue. He will return for follow up in 6 months or earlier if needed.  [EKG obtained to assist in diagnosis and management of assessed problem(s)] : EKG obtained to assist in diagnosis and management of assessed problem(s)

## 2023-07-25 ENCOUNTER — LABORATORY RESULT (OUTPATIENT)
Age: 63
End: 2023-07-25

## 2023-07-25 ENCOUNTER — APPOINTMENT (OUTPATIENT)
Dept: INTERNAL MEDICINE | Facility: CLINIC | Age: 63
End: 2023-07-25
Payer: MEDICARE

## 2023-07-25 VITALS
BODY MASS INDEX: 28 KG/M2 | SYSTOLIC BLOOD PRESSURE: 129 MMHG | WEIGHT: 200 LBS | HEART RATE: 86 BPM | HEIGHT: 71 IN | DIASTOLIC BLOOD PRESSURE: 69 MMHG

## 2023-07-25 DIAGNOSIS — Z98.890 OTHER SPECIFIED POSTPROCEDURAL STATES: ICD-10-CM

## 2023-07-25 DIAGNOSIS — Z86.79 OTHER SPECIFIED POSTPROCEDURAL STATES: ICD-10-CM

## 2023-07-25 PROCEDURE — 36415 COLL VENOUS BLD VENIPUNCTURE: CPT

## 2023-07-25 PROCEDURE — 99214 OFFICE O/P EST MOD 30 MIN: CPT | Mod: 25

## 2023-07-26 PROBLEM — Z98.890 S/P ABLATION OF ATRIAL FIBRILLATION: Status: ACTIVE | Noted: 2022-06-01

## 2023-07-26 NOTE — HISTORY OF PRESENT ILLNESS
[de-identified] : 62 year old male with h/o atrial fibrillation s/p RFA, HTN, alcohol use, cerebellar ataxia and gout presents for follow-up.\par \par Was treated with UTI 2 months ago.  POCT urinalysis-normal\par \par Still having dizziness upon sitting up from prone position.  Was recently seen by neurologist and diagnosed with cerebellar ataxia.\par \par Denies any acute issues\par

## 2023-07-26 NOTE — ASSESSMENT
[FreeTextEntry1] : 62 year old male with h/o PAF s/p cardioversion on eliquis, gout, hypertension, renal insufficiency presents for for follow-up\par \par s/p UTI treated with abx.  no urinary symptoms.  POCT u/a- normal \par -check labs \par \par Dizziness, unchanged and not worsening.  no syncope.  Thought to be related to his cerebellar ataxia\par -Advised to have neurology fax over results to review\par -Remain well-hydrated throughout the day\par \par PAF s/p cardioversion on eliquis, \par -cont eliquis, metoprolol\par -f/u with cardio, EP \par \par Gout, controlled\par -cont allopurinol \par -Check uric acid\par \par hypertension,-stable\par -Continue metoprolol\par \par Renal insufficiency, baseline Cr 1.2- 1.4, Can be related to hypotensive, allopurinol.\par -Repeat level\par \par h/o alcohol abuse.  In remission the last 14 months\par -Continue abstaining from alcohol\par -Continue attending AA meetings\par \par \par Follow-up in 6 months\par \par \par \par \par

## 2023-07-26 NOTE — PHYSICAL EXAM
[Normal] : normal rate, regular rhythm, normal S1 and S2 and no murmur heard [Pedal Pulses Present] : the pedal pulses are present [No Edema] : there was no peripheral edema [No Extremity Clubbing/Cyanosis] : no extremity clubbing/cyanosis [Soft] : abdomen soft [Non Tender] : non-tender [Non-distended] : non-distended [No Joint Swelling] : no joint swelling [No Rash] : no rash [No Focal Deficits] : no focal deficits [Normal Affect] : the affect was normal [Normal Mood] : the mood was normal

## 2023-08-11 ENCOUNTER — NON-APPOINTMENT (OUTPATIENT)
Age: 63
End: 2023-08-11

## 2023-08-18 ENCOUNTER — APPOINTMENT (OUTPATIENT)
Dept: CARDIOLOGY | Facility: CLINIC | Age: 63
End: 2023-08-18

## 2023-08-18 ENCOUNTER — NON-APPOINTMENT (OUTPATIENT)
Age: 63
End: 2023-08-18

## 2023-08-21 ENCOUNTER — CLINICAL ADVICE (OUTPATIENT)
Age: 63
End: 2023-08-21

## 2023-08-21 LAB
ALBUMIN SERPL ELPH-MCNC: 4.6 G/DL
ALP BLD-CCNC: 104 U/L
ALT SERPL-CCNC: 42 U/L
ANION GAP SERPL CALC-SCNC: 19 MMOL/L
AST SERPL-CCNC: 72 U/L
BILIRUB DIRECT SERPL-MCNC: 0.2 MG/DL
BILIRUB INDIRECT SERPL-MCNC: 0.2 MG/DL
BILIRUB SERPL-MCNC: 0.4 MG/DL
BILIRUB UR QL STRIP: NORMAL
BUN SERPL-MCNC: 47 MG/DL
CALCIUM SERPL-MCNC: 10.5 MG/DL
CHLORIDE SERPL-SCNC: 104 MMOL/L
CHOLEST SERPL-MCNC: 199 MG/DL
CLARITY UR: CLEAR
CO2 SERPL-SCNC: 24 MMOL/L
COLLECTION METHOD: NORMAL
CREAT SERPL-MCNC: 2.42 MG/DL
EGFR: 29 ML/MIN/1.73M2
ESTIMATED AVERAGE GLUCOSE: 85 MG/DL
GLUCOSE SERPL-MCNC: 123 MG/DL
GLUCOSE UR-MCNC: NEGATIVE
HBA1C MFR BLD HPLC: 4.6 %
HCG UR QL: 0.2 EU/DL
HDLC SERPL-MCNC: 93 MG/DL
HGB UR QL STRIP.AUTO: NEGATIVE
KETONES UR-MCNC: NORMAL
LDLC SERPL CALC-MCNC: 92 MG/DL
LEUKOCYTE ESTERASE UR QL STRIP: NEGATIVE
NITRITE UR QL STRIP: NEGATIVE
NONHDLC SERPL-MCNC: 105 MG/DL
PH UR STRIP: 5.5
POTASSIUM SERPL-SCNC: 4 MMOL/L
PROT SERPL-MCNC: 7.8 G/DL
PROT UR STRIP-MCNC: NORMAL
SODIUM SERPL-SCNC: 141 MMOL/L
SP GR UR STRIP: 1.02
TRIGL SERPL-MCNC: 77 MG/DL
TSH SERPL-ACNC: 3.96 UIU/ML
URATE SERPL-MCNC: 7.8 MG/DL

## 2023-09-12 ENCOUNTER — APPOINTMENT (OUTPATIENT)
Dept: INTERNAL MEDICINE | Facility: CLINIC | Age: 63
End: 2023-09-12

## 2023-09-20 ENCOUNTER — APPOINTMENT (OUTPATIENT)
Dept: CARDIOLOGY | Facility: CLINIC | Age: 63
End: 2023-09-20
Payer: MEDICARE

## 2023-09-20 ENCOUNTER — CLINICAL ADVICE (OUTPATIENT)
Age: 63
End: 2023-09-20

## 2023-09-20 ENCOUNTER — NON-APPOINTMENT (OUTPATIENT)
Age: 63
End: 2023-09-20

## 2023-09-20 VITALS — OXYGEN SATURATION: 97 % | DIASTOLIC BLOOD PRESSURE: 66 MMHG | HEART RATE: 64 BPM | SYSTOLIC BLOOD PRESSURE: 109 MMHG

## 2023-09-20 VITALS — WEIGHT: 193 LBS | BODY MASS INDEX: 26.92 KG/M2

## 2023-09-20 DIAGNOSIS — I48.0 PAROXYSMAL ATRIAL FIBRILLATION: ICD-10-CM

## 2023-09-20 DIAGNOSIS — R53.83 OTHER FATIGUE: ICD-10-CM

## 2023-09-20 DIAGNOSIS — R00.2 PALPITATIONS: ICD-10-CM

## 2023-09-20 LAB
ALBUMIN SERPL ELPH-MCNC: 4.2 G/DL
ANION GAP SERPL CALC-SCNC: 15 MMOL/L
BUN SERPL-MCNC: 38 MG/DL
CALCIUM SERPL-MCNC: 10 MG/DL
CHLORIDE SERPL-SCNC: 102 MMOL/L
CO2 SERPL-SCNC: 25 MMOL/L
CREAT SERPL-MCNC: 2.5 MG/DL
EGFR: 28 ML/MIN/1.73M2
GLUCOSE SERPL-MCNC: 113 MG/DL
PHOSPHATE SERPL-MCNC: 2.1 MG/DL
POTASSIUM SERPL-SCNC: 4.5 MMOL/L
SODIUM SERPL-SCNC: 141 MMOL/L

## 2023-09-20 PROCEDURE — 99214 OFFICE O/P EST MOD 30 MIN: CPT

## 2023-09-20 PROCEDURE — 93000 ELECTROCARDIOGRAM COMPLETE: CPT

## 2023-09-22 ENCOUNTER — OUTPATIENT (OUTPATIENT)
Dept: OUTPATIENT SERVICES | Facility: HOSPITAL | Age: 63
LOS: 1 days | End: 2023-09-22
Payer: MEDICARE

## 2023-09-22 ENCOUNTER — APPOINTMENT (OUTPATIENT)
Dept: ULTRASOUND IMAGING | Facility: IMAGING CENTER | Age: 63
End: 2023-09-22
Payer: MEDICARE

## 2023-09-22 DIAGNOSIS — Z78.9 OTHER SPECIFIED HEALTH STATUS: Chronic | ICD-10-CM

## 2023-09-22 DIAGNOSIS — R79.89 OTHER SPECIFIED ABNORMAL FINDINGS OF BLOOD CHEMISTRY: ICD-10-CM

## 2023-09-22 PROCEDURE — 76770 US EXAM ABDO BACK WALL COMP: CPT | Mod: 26

## 2023-09-22 PROCEDURE — 76770 US EXAM ABDO BACK WALL COMP: CPT

## 2023-09-26 ENCOUNTER — CLINICAL ADVICE (OUTPATIENT)
Age: 63
End: 2023-09-26

## 2023-10-03 ENCOUNTER — APPOINTMENT (OUTPATIENT)
Dept: NEPHROLOGY | Facility: CLINIC | Age: 63
End: 2023-10-03

## 2023-10-03 ENCOUNTER — NON-APPOINTMENT (OUTPATIENT)
Age: 63
End: 2023-10-03

## 2023-10-12 ENCOUNTER — EMERGENCY (EMERGENCY)
Facility: HOSPITAL | Age: 63
LOS: 0 days | Discharge: LEFT BEFORE DISCHARGE | End: 2023-10-13
Attending: EMERGENCY MEDICINE
Payer: MEDICARE

## 2023-10-12 VITALS
DIASTOLIC BLOOD PRESSURE: 87 MMHG | TEMPERATURE: 98 F | RESPIRATION RATE: 19 BRPM | OXYGEN SATURATION: 98 % | SYSTOLIC BLOOD PRESSURE: 138 MMHG | HEART RATE: 101 BPM | HEIGHT: 71 IN | WEIGHT: 222.01 LBS

## 2023-10-12 VITALS
HEART RATE: 82 BPM | OXYGEN SATURATION: 98 % | SYSTOLIC BLOOD PRESSURE: 135 MMHG | DIASTOLIC BLOOD PRESSURE: 87 MMHG | RESPIRATION RATE: 18 BRPM | TEMPERATURE: 98 F

## 2023-10-12 DIAGNOSIS — F10.10 ALCOHOL ABUSE, UNCOMPLICATED: ICD-10-CM

## 2023-10-12 DIAGNOSIS — Z78.9 OTHER SPECIFIED HEALTH STATUS: Chronic | ICD-10-CM

## 2023-10-12 DIAGNOSIS — Z53.21 PROCEDURE AND TREATMENT NOT CARRIED OUT DUE TO PATIENT LEAVING PRIOR TO BEING SEEN BY HEALTH CARE PROVIDER: ICD-10-CM
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
## 2023-10-12 DIAGNOSIS — I48.91 UNSPECIFIED ATRIAL FIBRILLATION: ICD-10-CM

## 2023-10-12 DIAGNOSIS — Z79.01 LONG TERM (CURRENT) USE OF ANTICOAGULANTS: ICD-10-CM

## 2023-10-12 LAB
ALBUMIN SERPL ELPH-MCNC: 3.1 G/DL — LOW (ref 3.3–5)
ALP SERPL-CCNC: 123 U/L — HIGH (ref 40–120)
ALT FLD-CCNC: 38 U/L — SIGNIFICANT CHANGE UP (ref 12–78)
ANION GAP SERPL CALC-SCNC: 7 MMOL/L — SIGNIFICANT CHANGE UP (ref 5–17)
AST SERPL-CCNC: 53 U/L — HIGH (ref 15–37)
BASOPHILS # BLD AUTO: 0.04 K/UL — SIGNIFICANT CHANGE UP (ref 0–0.2)
BASOPHILS NFR BLD AUTO: 0.8 % — SIGNIFICANT CHANGE UP (ref 0–2)
BILIRUB SERPL-MCNC: 0.2 MG/DL — SIGNIFICANT CHANGE UP (ref 0.2–1.2)
BUN SERPL-MCNC: 17 MG/DL — SIGNIFICANT CHANGE UP (ref 7–23)
CALCIUM SERPL-MCNC: 8.5 MG/DL — SIGNIFICANT CHANGE UP (ref 8.5–10.1)
CHLORIDE SERPL-SCNC: 113 MMOL/L — HIGH (ref 96–108)
CO2 SERPL-SCNC: 22 MMOL/L — SIGNIFICANT CHANGE UP (ref 22–31)
CREAT SERPL-MCNC: 1.17 MG/DL — SIGNIFICANT CHANGE UP (ref 0.5–1.3)
EGFR: 70 ML/MIN/1.73M2 — SIGNIFICANT CHANGE UP
EOSINOPHIL # BLD AUTO: 0.1 K/UL — SIGNIFICANT CHANGE UP (ref 0–0.5)
EOSINOPHIL NFR BLD AUTO: 2 % — SIGNIFICANT CHANGE UP (ref 0–6)
ETHANOL SERPL-MCNC: 305 MG/DL — HIGH (ref 0–10)
GLUCOSE SERPL-MCNC: 127 MG/DL — HIGH (ref 70–99)
HCT VFR BLD CALC: 34.3 % — LOW (ref 39–50)
HGB BLD-MCNC: 10.9 G/DL — LOW (ref 13–17)
IMM GRANULOCYTES NFR BLD AUTO: 0.4 % — SIGNIFICANT CHANGE UP (ref 0–0.9)
LYMPHOCYTES # BLD AUTO: 1.76 K/UL — SIGNIFICANT CHANGE UP (ref 1–3.3)
LYMPHOCYTES # BLD AUTO: 35.3 % — SIGNIFICANT CHANGE UP (ref 13–44)
MANUAL SMEAR VERIFICATION: SIGNIFICANT CHANGE UP
MCHC RBC-ENTMCNC: 31.8 G/DL — LOW (ref 32–36)
MCHC RBC-ENTMCNC: 33.7 PG — SIGNIFICANT CHANGE UP (ref 27–34)
MCV RBC AUTO: 106.2 FL — HIGH (ref 80–100)
MONOCYTES # BLD AUTO: 0.46 K/UL — SIGNIFICANT CHANGE UP (ref 0–0.9)
MONOCYTES NFR BLD AUTO: 9.2 % — SIGNIFICANT CHANGE UP (ref 2–14)
NEUTROPHILS # BLD AUTO: 2.6 K/UL — SIGNIFICANT CHANGE UP (ref 1.8–7.4)
NEUTROPHILS NFR BLD AUTO: 52.3 % — SIGNIFICANT CHANGE UP (ref 43–77)
NRBC # BLD: 0 /100 WBCS — SIGNIFICANT CHANGE UP (ref 0–0)
PLAT MORPH BLD: NORMAL — SIGNIFICANT CHANGE UP
PLATELET # BLD AUTO: 303 K/UL — SIGNIFICANT CHANGE UP (ref 150–400)
POTASSIUM SERPL-MCNC: 4.1 MMOL/L — SIGNIFICANT CHANGE UP (ref 3.5–5.3)
POTASSIUM SERPL-SCNC: 4.1 MMOL/L — SIGNIFICANT CHANGE UP (ref 3.5–5.3)
PROT SERPL-MCNC: 7.8 GM/DL — SIGNIFICANT CHANGE UP (ref 6–8.3)
RBC # BLD: 3.23 M/UL — LOW (ref 4.2–5.8)
RBC # FLD: 13.3 % — SIGNIFICANT CHANGE UP (ref 10.3–14.5)
RBC BLD AUTO: NORMAL — SIGNIFICANT CHANGE UP
SODIUM SERPL-SCNC: 142 MMOL/L — SIGNIFICANT CHANGE UP (ref 135–145)
WBC # BLD: 4.98 K/UL — SIGNIFICANT CHANGE UP (ref 3.8–10.5)
WBC # FLD AUTO: 4.98 K/UL — SIGNIFICANT CHANGE UP (ref 3.8–10.5)

## 2023-10-12 PROCEDURE — 99284 EMERGENCY DEPT VISIT MOD MDM: CPT

## 2023-10-12 PROCEDURE — 70450 CT HEAD/BRAIN W/O DYE: CPT | Mod: 26,MA

## 2023-10-12 PROCEDURE — 72125 CT NECK SPINE W/O DYE: CPT | Mod: 26,MA

## 2023-10-12 NOTE — ED PROVIDER NOTE - PROGRESS NOTE DETAILS
Attempts made to reach number for emergency contact listed which is the patient.  Number is not in service. Patient asked for another number for family he provided his sister Ayse [phone].  Number was called and is disconnected Nurse was able to reach patient's sister who said she would  patient and nurse made patient aware but appears to have eloped from ER at this time

## 2023-10-12 NOTE — ED PROVIDER NOTE - CLINICAL SUMMARY MEDICAL DECISION MAKING FREE TEXT BOX
Fall unable to get up from ground ETOH use hx oafib on Eliquis ambulating without difficulty in the ER.  Possible unreliable about head strike.  Will get CT and call family/friend for .

## 2023-10-12 NOTE — ED ADULT NURSE NOTE - OBJECTIVE STATEMENT
Pt is a 62y M AOx3 with a pmh of Afib on AC. Pt BIBA found wandering on the side of the road. Pt reports falling. Pt endorses drinking bourbon. Pt denies n/v/d, cp, sob, or abdominal pain.

## 2023-10-12 NOTE — ED ADULT TRIAGE NOTE - CHIEF COMPLAINT QUOTE
BIBA- fround wandering on side of road  Admits to drinking burbon BIBA- fround wandering on side of road  Admits to drinking [place]  EMS  BIBA- fround wandering on side of road  Admits to drinking [place]  EMS , Urinated on himself and malodorous

## 2023-10-12 NOTE — ED ADULT NURSE NOTE - NSFALLUNIVINTERV_ED_ALL_ED
Bed/Stretcher in lowest position, wheels locked, appropriate side rails in place/Call bell, personal items and telephone in reach/Instruct patient to call for assistance before getting out of bed/chair/stretcher/Non-slip footwear applied when patient is off stretcher/[place] to call system/Physically safe environment - no spills, clutter or unnecessary equipment/Purposeful proactive rounding/Room/bathroom lighting operational, light cord in reach

## 2023-10-12 NOTE — ED PROVIDER NOTE - OBJECTIVE STATEMENT
This patient is a 62 year old man hx of Afib on Eliquis BIBA with reports of suspected ETOH intoxication.  Patient states that he has been drinking and while walking he lost his balance and fell but was unable to get up.  He denies head strike or LOC.  As per triage note the patient was reportedly wandering around.  He has no complaints at this time.

## 2023-10-12 NOTE — ED ADULT NURSE NOTE - CHIEF COMPLAINT QUOTE
-- DO NOT REPLY / DO NOT REPLY ALL --  -- Message is from the [facility]--    COVID-19 Universal Screening: N/A - Not about scheduling    General Patient Message      Reason for Call: [facility] has not shipped to patient because the diagnosis code has not been entered. Please call back to provide info    Caller Information       Type Contact Phone    02/15/2021 01:51 PM CST Phone (Incoming) Alejandra [phone]     [facility]. Follow prompts to provider support order # [id number]          Alternative phone number: none    Turnaround time given to caller:   \"This message will be sent to [state Provider's name]. The clinical team will fulfill your request as soon as they review your message.\"     BIBA- fround wandering on side of road  Admits to drinking [place]  EMS , Urinated on himself and malodorous

## 2023-10-13 NOTE — ED ADULT NURSE REASSESSMENT NOTE - NS ED NURSE REASSESS COMMENT FT1
Pt AOx3 with steady gait. Pt eloped from ED. Pt's phone number called on file is disconnected. [employer] called overhead and RN made multiple attempts looking outside for the pt. MD Rose made aware.

## 2023-10-13 NOTE — ED ADULT NURSE REASSESSMENT NOTE - NS ED NURSE REASSESS COMMENT FT1
[facility] [employer], [place] badge number [id number] came to the ED for report on pt who eloped. [facility] [employer], [place] badge number [id number] came to the ED for report on pt who eloped. Incident Number [id number]

## 2023-10-17 ENCOUNTER — APPOINTMENT (OUTPATIENT)
Dept: INTERNAL MEDICINE | Facility: CLINIC | Age: 63
End: 2023-10-17

## 2023-11-06 ENCOUNTER — LABORATORY RESULT (OUTPATIENT)
Age: 63
End: 2023-11-06

## 2023-11-06 ENCOUNTER — APPOINTMENT (OUTPATIENT)
Dept: INTERNAL MEDICINE | Facility: CLINIC | Age: 63
End: 2023-11-06
Payer: MEDICARE

## 2023-11-06 VITALS
DIASTOLIC BLOOD PRESSURE: 79 MMHG | OXYGEN SATURATION: 98 % | HEIGHT: 71 IN | BODY MASS INDEX: 28 KG/M2 | HEART RATE: 74 BPM | WEIGHT: 200 LBS | SYSTOLIC BLOOD PRESSURE: 157 MMHG

## 2023-11-06 PROCEDURE — 99214 OFFICE O/P EST MOD 30 MIN: CPT | Mod: 25

## 2023-11-06 PROCEDURE — 36415 COLL VENOUS BLD VENIPUNCTURE: CPT

## 2023-11-08 ENCOUNTER — CLINICAL ADVICE (OUTPATIENT)
Age: 63
End: 2023-11-08

## 2023-11-08 LAB
25(OH)D3 SERPL-MCNC: 41.5 NG/ML
ALBUMIN SERPL ELPH-MCNC: 3.9 G/DL
ALBUMIN SERPL ELPH-MCNC: 3.9 G/DL
ALBUPE: 73.7 %
ALP BLD-CCNC: 102 U/L
ALPHA1UPE: 10.3 %
ALPHA2UPE: 4.4 %
ALT SERPL-CCNC: 22 U/L
ANACR T: NEGATIVE
ANION GAP SERPL CALC-SCNC: 13 MMOL/L
AST SERPL-CCNC: 44 U/L
BASOPHILS # BLD AUTO: 0.04 K/UL
BASOPHILS NFR BLD AUTO: 0.8 %
BETAUPE: 4.7 %
BILIRUB DIRECT SERPL-MCNC: 0.2 MG/DL
BILIRUB INDIRECT SERPL-MCNC: 0.2 MG/DL
BILIRUB SERPL-MCNC: 0.4 MG/DL
BUN SERPL-MCNC: 23 MG/DL
CALCIUM SERPL-MCNC: 9.7 MG/DL
CALCIUM SERPL-MCNC: 9.7 MG/DL
CHLORIDE SERPL-SCNC: 105 MMOL/L
CO2 SERPL-SCNC: 24 MMOL/L
CREAT SERPL-MCNC: 1.4 MG/DL
CREAT SPEC-SCNC: 362 MG/DL
CREAT/PROT UR: 0.3 RATIO
CYSTATIN C SERPL-MCNC: 1.84 MG/L
EGFR: 56 ML/MIN/1.73M2
EOSINOPHIL # BLD AUTO: 0.14 K/UL
EOSINOPHIL NFR BLD AUTO: 2.7 %
GAMMAUPE: 6.9 %
GFR/BSA.PRED SERPLBLD CYS-BASED-ARV: 34 ML/MIN/1.73M2
GLUCOSE SERPL-MCNC: 100 MG/DL
HCT VFR BLD CALC: 32.7 %
HGB BLD-MCNC: 10 G/DL
IGA 24H UR QL IFE: NORMAL
IMM GRANULOCYTES NFR BLD AUTO: 0.4 %
KAPPA LC 24H UR QL: NORMAL
LYMPHOCYTES # BLD AUTO: 1.74 K/UL
LYMPHOCYTES NFR BLD AUTO: 33.5 %
MAN DIFF?: NORMAL
MCHC RBC-ENTMCNC: 30.6 GM/DL
MCHC RBC-ENTMCNC: 33.8 PG
MCV RBC AUTO: 110.5 FL
MONOCYTES # BLD AUTO: 0.6 K/UL
MONOCYTES NFR BLD AUTO: 11.6 %
NEUTROPHILS # BLD AUTO: 2.65 K/UL
NEUTROPHILS NFR BLD AUTO: 51 %
PARATHYROID HORMONE INTACT: 37 PG/ML
PHOSPHATE SERPL-MCNC: 2.8 MG/DL
PLATELET # BLD AUTO: 214 K/UL
POTASSIUM SERPL-SCNC: 4 MMOL/L
PROT PATTERN 24H UR ELPH-IMP: NORMAL
PROT SERPL-MCNC: 7.3 G/DL
PROT UR-MCNC: 120 MG/DL
PROT UR-MCNC: 122 MG/DL
PROT UR-MCNC: 122 MG/DL
RBC # BLD: 2.96 M/UL
RBC # FLD: 13.4 %
SODIUM SERPL-SCNC: 142 MMOL/L
WBC # FLD AUTO: 5.19 K/UL

## 2023-11-09 ENCOUNTER — CLINICAL ADVICE (OUTPATIENT)
Age: 63
End: 2023-11-09

## 2023-11-09 LAB
ALBUMIN MFR SERPL ELPH: 49.9 %
ALBUMIN SERPL-MCNC: 3.6 G/DL
ALBUMIN/GLOB SERPL: 1 RATIO
ALPHA1 GLOB MFR SERPL ELPH: 4.6 %
ALPHA1 GLOB SERPL ELPH-MCNC: 0.3 G/DL
ALPHA2 GLOB MFR SERPL ELPH: 11.1 %
ALPHA2 GLOB SERPL ELPH-MCNC: 0.8 G/DL
B-GLOBULIN MFR SERPL ELPH: 14.8 %
B-GLOBULIN SERPL ELPH-MCNC: 1.1 G/DL
GAMMA GLOB FLD ELPH-MCNC: 1.4 G/DL
GAMMA GLOB MFR SERPL ELPH: 19.6 %
INTERPRETATION SERPL IEP-IMP: NORMAL
M PROTEIN SPEC IFE-MCNC: NORMAL
PROT SERPL-MCNC: 7.3 G/DL
PROT SERPL-MCNC: 7.3 G/DL

## 2023-11-20 ENCOUNTER — APPOINTMENT (OUTPATIENT)
Dept: NEPHROLOGY | Facility: CLINIC | Age: 63
End: 2023-11-20
Payer: MEDICARE

## 2023-11-20 VITALS
HEART RATE: 76 BPM | DIASTOLIC BLOOD PRESSURE: 82 MMHG | BODY MASS INDEX: 27.86 KG/M2 | OXYGEN SATURATION: 99 % | TEMPERATURE: 97.5 F | WEIGHT: 199 LBS | SYSTOLIC BLOOD PRESSURE: 156 MMHG | HEIGHT: 71 IN

## 2023-11-20 VITALS
BODY MASS INDEX: 27.86 KG/M2 | DIASTOLIC BLOOD PRESSURE: 78 MMHG | OXYGEN SATURATION: 98 % | WEIGHT: 199 LBS | SYSTOLIC BLOOD PRESSURE: 138 MMHG | RESPIRATION RATE: 16 BRPM | HEART RATE: 78 BPM | HEIGHT: 71 IN

## 2023-11-20 DIAGNOSIS — D64.9 ANEMIA, UNSPECIFIED: ICD-10-CM

## 2023-11-20 DIAGNOSIS — D86.9 SARCOIDOSIS, UNSPECIFIED: ICD-10-CM

## 2023-11-20 DIAGNOSIS — M10.9 GOUT, UNSPECIFIED: ICD-10-CM

## 2023-11-20 PROCEDURE — 99203 OFFICE O/P NEW LOW 30 MIN: CPT

## 2023-11-20 RX ORDER — SULFAMETHOXAZOLE AND TRIMETHOPRIM 800; 160 MG/1; MG/1
800-160 TABLET ORAL TWICE DAILY
Qty: 20 | Refills: 0 | Status: DISCONTINUED | COMMUNITY
Start: 2023-05-31 | End: 2023-11-20

## 2023-11-21 ENCOUNTER — RX RENEWAL (OUTPATIENT)
Age: 63
End: 2023-11-21

## 2023-11-21 LAB
FERRITIN SERPL-MCNC: 1172 NG/ML
FOLATE SERPL-MCNC: 15.9 NG/ML
HCV RNA SERPL NAA+PROBE-LOG IU: NOT DETECTED LOGIU/ML
HEPC RNA INTERP: NOT DETECTED
IRON SATN MFR SERPL: 15 %
IRON SERPL-MCNC: 32 UG/DL
TIBC SERPL-MCNC: 210 UG/DL
UIBC SERPL-MCNC: 178 UG/DL
VIT B12 SERPL-MCNC: 684 PG/ML

## 2023-11-21 RX ORDER — METOPROLOL SUCCINATE 25 MG/1
25 TABLET, EXTENDED RELEASE ORAL
Qty: 90 | Refills: 0 | Status: ACTIVE | COMMUNITY
Start: 2023-05-19 | End: 1900-01-01

## 2023-12-28 RX ORDER — APIXABAN 5 MG/1
5 TABLET, FILM COATED ORAL
Qty: 180 | Refills: 2 | Status: ACTIVE | COMMUNITY
Start: 2022-03-10 | End: 1900-01-01

## 2024-01-03 ENCOUNTER — APPOINTMENT (OUTPATIENT)
Dept: ELECTROPHYSIOLOGY | Facility: CLINIC | Age: 64
End: 2024-01-03

## 2024-01-03 NOTE — HISTORY OF PRESENT ILLNESS
[FreeTextEntry1] : Mr. Carlos is a pleasant 63 year old male here for follow up today. He has a history of HTN, gout, remote sarcoidosis of the lung, alcohol abuse, and cerebellar ataxia.  To summarize, the patient was diagnosed with new onset symptomatic atrial fibrillation during a hospital admission in 2022 . He underwent ADY/DCCV for atrial fibrillation while in the hospital and was discharged home on eliquis, metoprolol and flecainide. TTE revealed normal LVEF and no significant valve disease, and a stress test was negative for ischemia or infarct. The patient was referred to the ED from [facility]. Prior to admission the patient reported a history of shortness of breath and dizziness of several months duration. He underwent catheter ablation for atrial fibrillation on 5/16/22 (PVI + CTI). He reports to be doing well since with no recurrent symptoms of AF. He wore a one week event monitor in 8/2022 which did not reveal any evidence of atrial fibrillation. The patient is currently taking metoprolol and eliquis which he is tolerating well.  INCOMPLETE NOTE NOT YET SEEN

## 2024-01-24 ENCOUNTER — APPOINTMENT (OUTPATIENT)
Dept: CARDIOLOGY | Facility: CLINIC | Age: 64
End: 2024-01-24

## 2024-01-30 ENCOUNTER — APPOINTMENT (OUTPATIENT)
Dept: CARDIOLOGY | Facility: CLINIC | Age: 64
End: 2024-01-30

## 2024-03-11 ENCOUNTER — APPOINTMENT (OUTPATIENT)
Dept: INTERNAL MEDICINE | Facility: CLINIC | Age: 64
End: 2024-03-11
Payer: MEDICARE

## 2024-03-11 ENCOUNTER — LABORATORY RESULT (OUTPATIENT)
Age: 64
End: 2024-03-11

## 2024-03-11 VITALS
OXYGEN SATURATION: 99 % | WEIGHT: 206 LBS | SYSTOLIC BLOOD PRESSURE: 161 MMHG | DIASTOLIC BLOOD PRESSURE: 85 MMHG | HEART RATE: 81 BPM | BODY MASS INDEX: 28.84 KG/M2 | HEIGHT: 71 IN

## 2024-03-11 DIAGNOSIS — F10.20 ALCOHOL DEPENDENCE, UNCOMPLICATED: ICD-10-CM

## 2024-03-11 DIAGNOSIS — R79.89 OTHER SPECIFIED ABNORMAL FINDINGS OF BLOOD CHEMISTRY: ICD-10-CM

## 2024-03-11 DIAGNOSIS — Z87.39 PERSONAL HISTORY OF OTHER DISEASES OF THE MUSCULOSKELETAL SYSTEM AND CONNECTIVE TISSUE: ICD-10-CM

## 2024-03-11 DIAGNOSIS — I48.19 OTHER PERSISTENT ATRIAL FIBRILLATION: ICD-10-CM

## 2024-03-11 PROCEDURE — 36415 COLL VENOUS BLD VENIPUNCTURE: CPT

## 2024-03-11 PROCEDURE — G2211 COMPLEX E/M VISIT ADD ON: CPT

## 2024-03-11 PROCEDURE — 99214 OFFICE O/P EST MOD 30 MIN: CPT

## 2024-03-12 NOTE — ASSESSMENT
[FreeTextEntry1] :   h/o alcohol abuse. with recurrent binges, patient stated on initial visit, states had been sober for 14 years.  sister in room today discloses that he lives with her and finds he has long stents on drinking binges.  has been trying to get him help.  mother- h/o alcoholism -will need to refrain from alcohol  -Continue attending AA meetings -substance abuse program referral   THOMAS likely related to above,  nephro consult appreciated  -repeat today   Cerebellar ataxia -stable  -Advised to have neurology fax over results to review -Remain well-hydrated throughout the day  PAF s/p cardioversion on eliquis, -cont eliquis, metoprolol -f/u with cardio, EP  Gout, controlled -cont allopurinol  Hypertension,-stable -Continue metoprolol     no

## 2024-03-12 NOTE — HISTORY OF PRESENT ILLNESS
[de-identified] : 62 year old male with h/o atrial fibrillation s/p RFA, HTN, alcohol use, cerebellar ataxia and gout presents for follow-up. presents with sister   patient still has periods of heavy drinking.  sister is looking for support to help him stop drinking.   hasnt been attending AA meetings, but plans to restart  noted to have THOMAS on recent labs, unknown etiology but can be related to above.  seen by nephro  Denies any acute issues

## 2024-03-13 ENCOUNTER — CLINICAL ADVICE (OUTPATIENT)
Age: 64
End: 2024-03-13

## 2024-03-13 LAB
ALBUMIN SERPL ELPH-MCNC: 4 G/DL
ALBUMIN SERPL ELPH-MCNC: 4 G/DL
ALP BLD-CCNC: 127 U/L
ALT SERPL-CCNC: 24 U/L
ANION GAP SERPL CALC-SCNC: 13 MMOL/L
AST SERPL-CCNC: 42 U/L
BASOPHILS # BLD AUTO: 0.03 K/UL
BASOPHILS NFR BLD AUTO: 0.7 %
BILIRUB DIRECT SERPL-MCNC: 0.1 MG/DL
BILIRUB INDIRECT SERPL-MCNC: 0.1 MG/DL
BILIRUB SERPL-MCNC: 0.3 MG/DL
BUN SERPL-MCNC: 27 MG/DL
CALCIUM SERPL-MCNC: 9.2 MG/DL
CHLORIDE SERPL-SCNC: 106 MMOL/L
CHOLEST SERPL-MCNC: 168 MG/DL
CO2 SERPL-SCNC: 23 MMOL/L
CREAT SERPL-MCNC: 1.36 MG/DL
EGFR: 58 ML/MIN/1.73M2
EOSINOPHIL # BLD AUTO: 0.15 K/UL
EOSINOPHIL NFR BLD AUTO: 3.3 %
ESTIMATED AVERAGE GLUCOSE: 85 MG/DL
GLUCOSE SERPL-MCNC: 138 MG/DL
HBA1C MFR BLD HPLC: 4.6 %
HCT VFR BLD CALC: 32.7 %
HDLC SERPL-MCNC: 67 MG/DL
HGB BLD-MCNC: 10.3 G/DL
IMM GRANULOCYTES NFR BLD AUTO: 0.2 %
LDLC SERPL CALC-MCNC: 81 MG/DL
LYMPHOCYTES # BLD AUTO: 1.28 K/UL
LYMPHOCYTES NFR BLD AUTO: 27.9 %
MAN DIFF?: NORMAL
MCHC RBC-ENTMCNC: 31.5 GM/DL
MCHC RBC-ENTMCNC: 33.8 PG
MCV RBC AUTO: 107.2 FL
MONOCYTES # BLD AUTO: 0.44 K/UL
MONOCYTES NFR BLD AUTO: 9.6 %
NEUTROPHILS # BLD AUTO: 2.67 K/UL
NEUTROPHILS NFR BLD AUTO: 58.3 %
NONHDLC SERPL-MCNC: 101 MG/DL
PHOSPHATE SERPL-MCNC: 3 MG/DL
PLATELET # BLD AUTO: 170 K/UL
POTASSIUM SERPL-SCNC: 4.1 MMOL/L
PROT SERPL-MCNC: 7.4 G/DL
RBC # BLD: 3.05 M/UL
RBC # FLD: 12.8 %
SODIUM SERPL-SCNC: 142 MMOL/L
TRIGL SERPL-MCNC: 112 MG/DL
TSH SERPL-ACNC: 4.99 UIU/ML
WBC # FLD AUTO: 4.58 K/UL

## 2024-04-10 NOTE — ED ADULT TRIAGE NOTE - AS TEMP SITE
Patient needs a referral to Dr. Trevon Flynn at [facility].  
Referral requested submitted   
oral

## 2024-08-19 ENCOUNTER — NON-APPOINTMENT (OUTPATIENT)
Age: 64
End: 2024-08-19

## 2024-08-26 ENCOUNTER — LABORATORY RESULT (OUTPATIENT)
Age: 64
End: 2024-08-26

## 2024-08-26 ENCOUNTER — APPOINTMENT (OUTPATIENT)
Dept: INTERNAL MEDICINE | Facility: CLINIC | Age: 64
End: 2024-08-26
Payer: MEDICARE

## 2024-08-26 VITALS
HEIGHT: 71 IN | DIASTOLIC BLOOD PRESSURE: 94 MMHG | WEIGHT: 188 LBS | SYSTOLIC BLOOD PRESSURE: 153 MMHG | BODY MASS INDEX: 26.32 KG/M2 | HEART RATE: 80 BPM | OXYGEN SATURATION: 99 %

## 2024-08-26 DIAGNOSIS — R79.89 OTHER SPECIFIED ABNORMAL FINDINGS OF BLOOD CHEMISTRY: ICD-10-CM

## 2024-08-26 PROCEDURE — 99214 OFFICE O/P EST MOD 30 MIN: CPT

## 2024-08-26 PROCEDURE — 36415 COLL VENOUS BLD VENIPUNCTURE: CPT

## 2024-08-26 PROCEDURE — G2211 COMPLEX E/M VISIT ADD ON: CPT

## 2024-08-26 NOTE — HISTORY OF PRESENT ILLNESS
[de-identified] : 63 year old male with h/o Atrial fibrillation s/p RFA, HTN, alcohol use, cerebellar ataxia and gout presents for annual exam.    couple of beers last week.   patient still has periods of heavy drinking.   has been going to AA meetings.    Denies any acute issues.    single  lives with sister  former smoker

## 2024-08-26 NOTE — HISTORY OF PRESENT ILLNESS
[de-identified] : 63 year old male with h/o Atrial fibrillation s/p RFA, HTN, alcohol use, cerebellar ataxia and gout presents for annual exam.    couple of beers last week.   patient still has periods of heavy drinking.   has been going to AA meetings.    Denies any acute issues.    single  lives with sister  former smoker

## 2024-08-26 NOTE — ASSESSMENT
[FreeTextEntry1] : //  h/o alcohol abuse. with recurrent binges, patient stated on initial visit, had been sober for 14 years. then with sister in room at previous visit endorses still has periods of alcohol use, on/off hich results into binges   mother- h/o alcoholism -will need to refrain from alcohol  -Continue attending AA meetings -substance abuse program referral   Stage III CKD, likely related to above, nephrology consult appreciated  -repeat today  -avoid alcohol -strict BP control   Cerebellar ataxia -stable  -Advised to have neurology fax over results to review -Remain well-hydrated throughout the day  PAF s/p cardioversion on eliquis, -continue eliquis, metoprolol -f/u with cardio, EP  Gout, controlled -continue allopurinol  Hypertension,-stable -Continue metoprolol

## 2024-08-27 ENCOUNTER — CLINICAL ADVICE (OUTPATIENT)
Age: 64
End: 2024-08-27

## 2024-08-27 LAB
ALBUMIN SERPL ELPH-MCNC: 4.1 G/DL
ALP BLD-CCNC: 107 U/L
ALT SERPL-CCNC: 33 U/L
ANION GAP SERPL CALC-SCNC: 12 MMOL/L
AST SERPL-CCNC: 54 U/L
BASOPHILS # BLD AUTO: 0.06 K/UL
BASOPHILS NFR BLD AUTO: 1.5 %
BILIRUB DIRECT SERPL-MCNC: 0.2 MG/DL
BILIRUB INDIRECT SERPL-MCNC: 0.2 MG/DL
BILIRUB SERPL-MCNC: 0.4 MG/DL
BUN SERPL-MCNC: 22 MG/DL
CALCIUM SERPL-MCNC: 9.5 MG/DL
CHLORIDE SERPL-SCNC: 108 MMOL/L
CHOLEST SERPL-MCNC: 179 MG/DL
CO2 SERPL-SCNC: 23 MMOL/L
CREAT SERPL-MCNC: 1.5 MG/DL
EGFR: 51 ML/MIN/1.73M2
EOSINOPHIL # BLD AUTO: 0.14 K/UL
EOSINOPHIL NFR BLD AUTO: 3.4 %
ESTIMATED AVERAGE GLUCOSE: 82 MG/DL
GLUCOSE SERPL-MCNC: 114 MG/DL
HBA1C MFR BLD HPLC: 4.5 %
HCT VFR BLD CALC: 32.5 %
HDLC SERPL-MCNC: 72 MG/DL
HGB BLD-MCNC: 10.3 G/DL
IMM GRANULOCYTES NFR BLD AUTO: 0.5 %
LDLC SERPL CALC-MCNC: 93 MG/DL
LYMPHOCYTES # BLD AUTO: 1.37 K/UL
LYMPHOCYTES NFR BLD AUTO: 33.2 %
MAN DIFF?: NORMAL
MCHC RBC-ENTMCNC: 31.7 GM/DL
MCHC RBC-ENTMCNC: 33.2 PG
MCV RBC AUTO: 104.8 FL
MONOCYTES # BLD AUTO: 0.45 K/UL
MONOCYTES NFR BLD AUTO: 10.9 %
NEUTROPHILS # BLD AUTO: 2.09 K/UL
NEUTROPHILS NFR BLD AUTO: 50.5 %
NONHDLC SERPL-MCNC: 106 MG/DL
PLATELET # BLD AUTO: 199 K/UL
POTASSIUM SERPL-SCNC: 4.3 MMOL/L
PROT SERPL-MCNC: 7.6 G/DL
RBC # BLD: 3.1 M/UL
RBC # FLD: 13.8 %
SODIUM SERPL-SCNC: 143 MMOL/L
TRIGL SERPL-MCNC: 73 MG/DL
TSH SERPL-ACNC: 5.71 UIU/ML
WBC # FLD AUTO: 4.13 K/UL

## 2025-02-03 ENCOUNTER — APPOINTMENT (OUTPATIENT)
Dept: CARDIOLOGY | Facility: CLINIC | Age: 65
End: 2025-02-03
Payer: MEDICARE

## 2025-02-03 ENCOUNTER — NON-APPOINTMENT (OUTPATIENT)
Age: 65
End: 2025-02-03

## 2025-02-03 VITALS
OXYGEN SATURATION: 98 % | SYSTOLIC BLOOD PRESSURE: 150 MMHG | WEIGHT: 196 LBS | HEIGHT: 71 IN | HEART RATE: 71 BPM | BODY MASS INDEX: 27.44 KG/M2 | DIASTOLIC BLOOD PRESSURE: 82 MMHG

## 2025-02-03 DIAGNOSIS — D64.9 ANEMIA, UNSPECIFIED: ICD-10-CM

## 2025-02-03 DIAGNOSIS — M79.89 OTHER SPECIFIED SOFT TISSUE DISORDERS: ICD-10-CM

## 2025-02-03 DIAGNOSIS — D86.9 SARCOIDOSIS, UNSPECIFIED: ICD-10-CM

## 2025-02-03 DIAGNOSIS — Z86.79 OTHER SPECIFIED POSTPROCEDURAL STATES: ICD-10-CM

## 2025-02-03 DIAGNOSIS — R79.89 OTHER SPECIFIED ABNORMAL FINDINGS OF BLOOD CHEMISTRY: ICD-10-CM

## 2025-02-03 DIAGNOSIS — F10.20 ALCOHOL DEPENDENCE, UNCOMPLICATED: ICD-10-CM

## 2025-02-03 DIAGNOSIS — R06.02 SHORTNESS OF BREATH: ICD-10-CM

## 2025-02-03 DIAGNOSIS — Z98.890 OTHER SPECIFIED POSTPROCEDURAL STATES: ICD-10-CM

## 2025-02-03 DIAGNOSIS — M10.9 GOUT, UNSPECIFIED: ICD-10-CM

## 2025-02-03 PROCEDURE — 99214 OFFICE O/P EST MOD 30 MIN: CPT

## 2025-02-03 PROCEDURE — G2211 COMPLEX E/M VISIT ADD ON: CPT

## 2025-02-03 PROCEDURE — 93000 ELECTROCARDIOGRAM COMPLETE: CPT

## 2025-03-10 ENCOUNTER — LABORATORY RESULT (OUTPATIENT)
Age: 65
End: 2025-03-10

## 2025-03-10 ENCOUNTER — APPOINTMENT (OUTPATIENT)
Dept: INTERNAL MEDICINE | Facility: CLINIC | Age: 65
End: 2025-03-10
Payer: MEDICARE

## 2025-03-10 VITALS
HEART RATE: 76 BPM | OXYGEN SATURATION: 96 % | BODY MASS INDEX: 28.28 KG/M2 | DIASTOLIC BLOOD PRESSURE: 73 MMHG | RESPIRATION RATE: 16 BRPM | HEIGHT: 71 IN | WEIGHT: 202 LBS | SYSTOLIC BLOOD PRESSURE: 136 MMHG

## 2025-03-10 DIAGNOSIS — I10 ESSENTIAL (PRIMARY) HYPERTENSION: ICD-10-CM

## 2025-03-10 DIAGNOSIS — Z23 ENCOUNTER FOR IMMUNIZATION: ICD-10-CM

## 2025-03-10 DIAGNOSIS — F10.20 ALCOHOL DEPENDENCE, UNCOMPLICATED: ICD-10-CM

## 2025-03-10 DIAGNOSIS — I48.0 PAROXYSMAL ATRIAL FIBRILLATION: ICD-10-CM

## 2025-03-10 DIAGNOSIS — D64.9 ANEMIA, UNSPECIFIED: ICD-10-CM

## 2025-03-10 PROCEDURE — 36415 COLL VENOUS BLD VENIPUNCTURE: CPT

## 2025-03-10 PROCEDURE — G0009: CPT

## 2025-03-10 PROCEDURE — 99214 OFFICE O/P EST MOD 30 MIN: CPT

## 2025-03-10 PROCEDURE — 90677 PCV20 VACCINE IM: CPT

## 2025-03-10 PROCEDURE — G2211 COMPLEX E/M VISIT ADD ON: CPT

## 2025-03-11 LAB
ALBUMIN SERPL ELPH-MCNC: 3.9 G/DL
ALP BLD-CCNC: 114 U/L
ALT SERPL-CCNC: 13 U/L
ANION GAP SERPL CALC-SCNC: 12 MMOL/L
AST SERPL-CCNC: 25 U/L
BASOPHILS # BLD AUTO: 0.1 K/UL
BASOPHILS NFR BLD AUTO: 1.8 %
BILIRUB DIRECT SERPL-MCNC: 0.2 MG/DL
BILIRUB INDIRECT SERPL-MCNC: 0.3 MG/DL
BILIRUB SERPL-MCNC: 0.5 MG/DL
BUN SERPL-MCNC: 26 MG/DL
CALCIUM SERPL-MCNC: 9.3 MG/DL
CHLORIDE SERPL-SCNC: 108 MMOL/L
CHOLEST SERPL-MCNC: 187 MG/DL
CO2 SERPL-SCNC: 22 MMOL/L
CREAT SERPL-MCNC: 1.62 MG/DL
EGFRCR SERPLBLD CKD-EPI 2021: 47 ML/MIN/1.73M2
EOSINOPHIL # BLD AUTO: 0 K/UL
EOSINOPHIL NFR BLD AUTO: 0 %
ESTIMATED AVERAGE GLUCOSE: 85 MG/DL
GLUCOSE SERPL-MCNC: 112 MG/DL
HBA1C MFR BLD HPLC: 4.6 %
HCT VFR BLD CALC: 33.1 %
HDLC SERPL-MCNC: 78 MG/DL
HGB BLD-MCNC: 10.3 G/DL
LDLC SERPL CALC-MCNC: 91 MG/DL
LYMPHOCYTES # BLD AUTO: 1.8 K/UL
LYMPHOCYTES NFR BLD AUTO: 34 %
MAN DIFF?: NORMAL
MCHC RBC-ENTMCNC: 31.1 G/DL
MCHC RBC-ENTMCNC: 33.6 PG
MCV RBC AUTO: 107.8 FL
MONOCYTES # BLD AUTO: 0.34 K/UL
MONOCYTES NFR BLD AUTO: 6.4 %
NEUTROPHILS # BLD AUTO: 3.05 K/UL
NEUTROPHILS NFR BLD AUTO: 57.8 %
NONHDLC SERPL-MCNC: 108 MG/DL
PLATELET # BLD AUTO: 234 K/UL
POTASSIUM SERPL-SCNC: 4.4 MMOL/L
PROT SERPL-MCNC: 7.3 G/DL
RBC # BLD: 3.07 M/UL
RBC # FLD: 12.9 %
SODIUM SERPL-SCNC: 143 MMOL/L
TRIGL SERPL-MCNC: 98 MG/DL
TSH SERPL-ACNC: 8.31 UIU/ML
WBC # FLD AUTO: 5.28 K/UL

## 2025-03-11 RX ORDER — CHLORHEXIDINE GLUCONATE 4 %
LIQUID (ML) TOPICAL DAILY
Qty: 90 | Refills: 3 | Status: ACTIVE | COMMUNITY
Start: 2025-03-11 | End: 1900-01-01

## 2025-03-22 ENCOUNTER — EMERGENCY (EMERGENCY)
Facility: HOSPITAL | Age: 65
LOS: 1 days | Discharge: HOME | End: 2025-03-22
Payer: MEDICARE

## 2025-03-22 VITALS
WEIGHT: 220.02 LBS | SYSTOLIC BLOOD PRESSURE: 122 MMHG | OXYGEN SATURATION: 97 % | HEART RATE: 71 BPM | HEIGHT: 72 IN | DIASTOLIC BLOOD PRESSURE: 81 MMHG | TEMPERATURE: 98 F | RESPIRATION RATE: 18 BRPM

## 2025-03-22 VITALS
OXYGEN SATURATION: 97 % | TEMPERATURE: 98 F | RESPIRATION RATE: 16 BRPM | SYSTOLIC BLOOD PRESSURE: 125 MMHG | HEART RATE: 70 BPM | DIASTOLIC BLOOD PRESSURE: 79 MMHG

## 2025-03-22 LAB
ALBUMIN SERPL ELPH-MCNC: 4 G/DL — SIGNIFICANT CHANGE UP (ref 3.3–5)
ALP SERPL-CCNC: 124 U/L — HIGH (ref 40–120)
ALT FLD-CCNC: 23 U/L — SIGNIFICANT CHANGE UP (ref 10–45)
ANION GAP SERPL CALC-SCNC: 18 MMOL/L — HIGH (ref 5–17)
AST SERPL-CCNC: 66 U/L — HIGH (ref 10–40)
BASOPHILS # BLD AUTO: 0.03 K/UL — SIGNIFICANT CHANGE UP (ref 0–0.2)
BASOPHILS NFR BLD AUTO: 0.5 % — SIGNIFICANT CHANGE UP (ref 0–2)
BILIRUB SERPL-MCNC: 0.3 MG/DL — SIGNIFICANT CHANGE UP (ref 0.2–1.2)
BUN SERPL-MCNC: 29 MG/DL — HIGH (ref 7–23)
CALCIUM SERPL-MCNC: 9.2 MG/DL — SIGNIFICANT CHANGE UP (ref 8.4–10.5)
CHLORIDE SERPL-SCNC: 101 MMOL/L — SIGNIFICANT CHANGE UP (ref 96–108)
CO2 SERPL-SCNC: 19 MMOL/L — LOW (ref 22–31)
CREAT SERPL-MCNC: 1.53 MG/DL — HIGH (ref 0.5–1.3)
EGFR: 50 ML/MIN/1.73M2 — LOW
EGFR: 50 ML/MIN/1.73M2 — LOW
EOSINOPHIL # BLD AUTO: 0.24 K/UL — SIGNIFICANT CHANGE UP (ref 0–0.5)
EOSINOPHIL NFR BLD AUTO: 4.3 % — SIGNIFICANT CHANGE UP (ref 0–6)
ETHANOL SERPL-MCNC: 271 MG/DL — HIGH (ref 0–10)
FOLATE SERPL-MCNC: 9.9 NG/ML — SIGNIFICANT CHANGE UP
GLUCOSE SERPL-MCNC: 115 MG/DL — HIGH (ref 70–99)
HCT VFR BLD CALC: 31.4 % — LOW (ref 39–50)
HGB BLD-MCNC: 10.2 G/DL — LOW (ref 13–17)
IMM GRANULOCYTES NFR BLD AUTO: 0.4 % — SIGNIFICANT CHANGE UP (ref 0–0.9)
LYMPHOCYTES # BLD AUTO: 2.36 K/UL — SIGNIFICANT CHANGE UP (ref 1–3.3)
LYMPHOCYTES # BLD AUTO: 42.1 % — SIGNIFICANT CHANGE UP (ref 13–44)
MCHC RBC-ENTMCNC: 32.5 G/DL — SIGNIFICANT CHANGE UP (ref 32–36)
MCHC RBC-ENTMCNC: 32.8 PG — SIGNIFICANT CHANGE UP (ref 27–34)
MCV RBC AUTO: 101 FL — HIGH (ref 80–100)
MONOCYTES # BLD AUTO: 0.42 K/UL — SIGNIFICANT CHANGE UP (ref 0–0.9)
MONOCYTES NFR BLD AUTO: 7.5 % — SIGNIFICANT CHANGE UP (ref 2–14)
NEUTROPHILS # BLD AUTO: 2.54 K/UL — SIGNIFICANT CHANGE UP (ref 1.8–7.4)
NEUTROPHILS NFR BLD AUTO: 45.2 % — SIGNIFICANT CHANGE UP (ref 43–77)
NRBC BLD AUTO-RTO: 0 /100 WBCS — SIGNIFICANT CHANGE UP (ref 0–0)
PLATELET # BLD AUTO: 146 K/UL — LOW (ref 150–400)
POTASSIUM SERPL-MCNC: 3.7 MMOL/L — SIGNIFICANT CHANGE UP (ref 3.5–5.3)
POTASSIUM SERPL-SCNC: 3.7 MMOL/L — SIGNIFICANT CHANGE UP (ref 3.5–5.3)
PROT SERPL-MCNC: 8 G/DL — SIGNIFICANT CHANGE UP (ref 6–8.3)
RBC # BLD: 3.11 M/UL — LOW (ref 4.2–5.8)
RBC # FLD: 12.6 % — SIGNIFICANT CHANGE UP (ref 10.3–14.5)
SODIUM SERPL-SCNC: 138 MMOL/L — SIGNIFICANT CHANGE UP (ref 135–145)
VIT B12 SERPL-MCNC: 592 PG/ML — SIGNIFICANT CHANGE UP (ref 232–1245)
WBC # BLD: 5.61 K/UL — SIGNIFICANT CHANGE UP (ref 3.8–10.5)
WBC # FLD AUTO: 5.61 K/UL — SIGNIFICANT CHANGE UP (ref 3.8–10.5)

## 2025-03-22 NOTE — ED PROVIDER NOTE - CLINICAL SUMMARY MEDICAL DECISION MAKING FREE TEXT BOX
64-year-old male, with PMH of Afib on eliquis, brought in by EMS after having sustained this fall at a bar.  Patient reports that he had been drinking today.  Reports that earlier today he had also been at his ophthalmologist office and had been told that he had glaucoma.  Patient does not remember how he arrived to the emergency department.  He denies drinking daily.  Denies any pain or injuries.      Vital signs within normal limits.      GENERAL: NAD, sitting in bed comfortably  HEAD:  Abrasion to L forehead  MSK: No midline ttp along spine  EYES: EOMI, conjunctiva and sclera clear  ENT: Moist mucous membranes  CHEST/LUNG: Unlabored respirations. CTA b/l  HEART:  No murmurs, rubs, or gallops  ABDOMEN: Soft, nondistended, nontender  EXTREMITIES:  No cyanosis or edema.   NERVOUS SYSTEM:  No focal deficits. A&Ox2

## 2025-03-22 NOTE — ED PROVIDER NOTE - PROGRESS NOTE DETAILS
Emergency Medicine / Medical Toxicology Attending MD Taveras - Progress note  Patient is attempting to elope from the ED.  Brought back by security.  Patient has not exhibited any significant clinical improvement while in the ED.  On further questioning patient believes that the date is 1955; the 32nd day of the month.  Possibly confabulating.  Gait is abnormal.  High suspicion for metabolic abnormality, DDx includes Wernicke's encephalopathy.  Will check labs, etoh level, administer 500mg thiamine.  Anticipate admit for further workup. Dr. Sandhya Chinchilla, ATTENDING: pt was a sign out to me. presented for fall likely 2/2 for suspected alcohol intoxication. pending labs and disposition for ??Wernicke's due to confusion and ataxic gait.    upon chart review - pt has hx of cerebellar ataxia, etoh abuse. was seen and evaluated by his cardiologist and PCP within the past month.   on reassessment, pt is A&Ox3, explains that he lives with his 2 sisters and has a safe place to go. denies SI/HI. pt states he is compliant with medications and is able to repeat his medications,  pt is clinically sober. labs reviewed, creatinine at baseline. rest of labs non-actionable  We have discussed the clinical course, treatment options and importance of follow up. Given strict return precautions to return to the ER if symptoms worsen or change in quality. All questions answered.

## 2025-03-22 NOTE — ED ADULT NURSE REASSESSMENT NOTE - NS ED NURSE REASSESS COMMENT FT1
pt ambulated to restroom independently. unsteady gait noted due to intox. pt ambulated to restroom independently with RN as standby assist. unsteady gait noted due to intox but cable to stand on his own to void in restroom.

## 2025-03-22 NOTE — ED PROVIDER NOTE - NSFOLLOWUPINSTRUCTIONS_ED_ALL_ED_FT
You were evaluated in the emergency department after a fall.  CT scan of your head was negative.  Labs drawn were also at your baseline.    Please decrease the amount of alcohol you are drinking.    Return to the emergency department for any new or concerning symptoms.  Continue taking medications as directed and follow-up with your primary care doctor.

## 2025-03-22 NOTE — ED ADULT NURSE REASSESSMENT NOTE - NS ED NURSE REASSESS COMMENT FT1
pt was found to elope from stretcher, unable to find pt in the ED. Code flight was activated due to pt being intox and incapable to be discharged. Security found pt and pt is now back in stretcher awaiting radiology results and reassess for possible d/c.

## 2025-03-22 NOTE — ED ADULT NURSE NOTE - SUICIDE SCREENING QUESTION 1
Quality 226: Preventive Care And Screening: Tobacco Use: Screening And Cessation Intervention: Patient screened for tobacco use and is an ex/non-smoker Quality 130: Documentation Of Current Medications In The Medical Record: Current Medications Documented Detail Level: Generalized Quality 431: Preventive Care And Screening: Unhealthy Alcohol Use - Screening: Patient not identified as an unhealthy alcohol user when screened for unhealthy alcohol use using a systematic screening method Quality 110: Preventive Care And Screening: Influenza Immunization: Influenza immunization was not ordered or administered, reason not given Yes

## 2025-03-22 NOTE — ED PROVIDER NOTE - ATTENDING CONTRIBUTION TO CARE
Emergency Medicine Attending MD Taveras:  patient seen and evaluated with the resident.  I was present for key portions of the History & Physical, and I agree with the Impression & Plan.    Patient is a 64-year-old male, brought in by EMS status post fall in a bar tonight after significant ethanol consumption.  Medical history significant for A-fib on Eliquis and metoprolol.  Patient was at a bar drinking excessively and blacked out.  He does not remember hitting his head, but he has ecchymosis and abrasions above his L eye.     Patient denies chest pain, shortness of breath.   relates no palpitations, no nausea, vomiting, no blurry vision, no double vision.    VS: wnl  Gen: adult M, NAD, +abrasion above L eye. +smells of ETOH  Head: abrasions noted as above, EOMI, PERRL  Neck: trachea midline  Resp:  No distress  CV: RRR, no RMG  Abd: nondistended  Ext: no deformities, nontender to palpation  Neuro:  A&Ox4 appears non focal  Skin:  Warm and dry as visualized  Psych: appropriate    Medical Decision Making / Differential Diagnosis:  HPI most consistent with ethanol related mechanical fall, still clinically intoxicated.  Suspicion for syncope is low given context.  Will check screening ECG. Emergency Medicine Attending MD Taveras:  patient seen and evaluated with the resident.  I was present for key portions of the History & Physical, and I agree with the Impression & Plan.    Patient is a 64-year-old male, brought in by EMS status post fall in a bar tonight after significant ethanol consumption.  Medical history significant for A-fib on Eliquis and metoprolol.  Patient was at a bar drinking excessively and blacked out.  He does not remember hitting his head, but he has ecchymosis and abrasions above his L eye.     Patient denies chest pain, shortness of breath.   relates no palpitations, no nausea, vomiting, no blurry vision, no double vision.    VS: wnl  Gen: adult M, NAD, +abrasion above L eye. +smells of ETOH  Head: abrasions noted as above, EOMI, PERRL  Neck: trachea midline  Resp:  No distress  CV: RRR, no RMG  Abd: nondistended  Ext: no deformities, nontender to palpation  Neuro:  A&Ox4 appears non focal  Skin:  Warm and dry as visualized  Psych: appropriate    Medical Decision Making / Differential Diagnosis:  HPI most consistent with ethanol related mechanical fall, still clinically intoxicated.  Suspicion for syncope is low given context.  Will check screening ECG.    ECG directly visualized by me and shows normal sinus rhythm, rate 67, , QRS 84, QTc 426.  No ST elevations or depressions. Emergency Medicine Attending MD Taveras:  patient seen and evaluated with the resident.  I was present for key portions of the History & Physical, and I agree with the Impression & Plan.    Patient is a 64-year-old male, brought in by EMS status post fall in a bar tonight after significant ethanol consumption.  Medical history significant for A-fib on Eliquis and metoprolol.  Patient was at a bar drinking excessively and blacked out.  He does not remember hitting his head, but he has ecchymosis and abrasions above his L eye.     Patient denies chest pain, shortness of breath.   relates no palpitations, no nausea, vomiting, no blurry vision, no double vision.    VS: wnl  Gen: adult M, NAD, +abrasion above L eye. +smells of ETOH  Head: abrasions noted as above, EOMI, PERRL  Neck: trachea midline  Resp:  No distress  CV: RRR, no RMG  Abd: nondistended  Ext: no deformities, nontender to palpation  Neuro:  A&Ox1, but overall appears non focal.  Gait not yet assessed.  Skin:  Warm and dry as visualized  Psych: appropriate    Medical Decision Making / Differential Diagnosis:  HPI most consistent with ethanol related mechanical fall, still clinically intoxicated.  Suspicion for syncope is low given context.  Will check screening ECG.    ECG directly visualized by me and shows normal sinus rhythm, rate 67, , QRS 84, QTc 426.  No ST elevations or depressions.

## 2025-03-22 NOTE — ED ADULT NURSE NOTE - NSFALLRISKINTERV_ED_ALL_ED

## 2025-03-22 NOTE — ED PROVIDER NOTE - PATIENT PORTAL LINK FT
You can access the FollowMyHealth Patient Portal offered by [facility] by registering at the following website: [email or web address]. By joining [facility]’s FollowMyHealth portal, you will also be able to view your health information using other applications (apps) compatible with our system.

## 2025-03-25 LAB — VIT B1 SERPL-MCNC: 108 NMOL/L — SIGNIFICANT CHANGE UP (ref 66.5–200)

## 2025-04-09 ENCOUNTER — APPOINTMENT (OUTPATIENT)
Dept: INTERNAL MEDICINE | Facility: CLINIC | Age: 65
End: 2025-04-09

## 2025-06-10 ENCOUNTER — APPOINTMENT (OUTPATIENT)
Dept: INTERNAL MEDICINE | Facility: CLINIC | Age: 65
End: 2025-06-10

## 2025-06-10 NOTE — ED ADULT TRIAGE NOTE - NS ED NURSE BANDS TYPE
Name band; Comments: Start date: 2/21/2025 Detail Level: Zone Latest Quantiferon Gold (Optional): TB Negative: 2/17/2025 Add High Risk Medication Management Associated Diagnosis?: No

## 2025-06-17 ENCOUNTER — APPOINTMENT (OUTPATIENT)
Dept: INTERNAL MEDICINE | Facility: CLINIC | Age: 65
End: 2025-06-17
Payer: MEDICARE

## 2025-06-17 VITALS
HEIGHT: 71 IN | HEART RATE: 76 BPM | OXYGEN SATURATION: 97 % | TEMPERATURE: 97.8 F | DIASTOLIC BLOOD PRESSURE: 108 MMHG | BODY MASS INDEX: 27.58 KG/M2 | SYSTOLIC BLOOD PRESSURE: 179 MMHG | WEIGHT: 197 LBS

## 2025-06-17 LAB
ALBUMIN SERPL ELPH-MCNC: 4 G/DL
ALP BLD-CCNC: 105 U/L
ALT SERPL-CCNC: 27 U/L
ANION GAP SERPL CALC-SCNC: 13 MMOL/L
AST SERPL-CCNC: 37 U/L
BILIRUB SERPL-MCNC: 0.6 MG/DL
BUN SERPL-MCNC: 28 MG/DL
CALCIUM SERPL-MCNC: 9.3 MG/DL
CHLORIDE SERPL-SCNC: 105 MMOL/L
CO2 SERPL-SCNC: 23 MMOL/L
CREAT SERPL-MCNC: 1.53 MG/DL
EGFRCR SERPLBLD CKD-EPI 2021: 50 ML/MIN/1.73M2
GLUCOSE SERPL-MCNC: 108 MG/DL
POTASSIUM SERPL-SCNC: 4.5 MMOL/L
PROT SERPL-MCNC: 7.4 G/DL
SODIUM SERPL-SCNC: 141 MMOL/L
URATE SERPL-MCNC: 9.4 MG/DL

## 2025-06-17 PROCEDURE — 36415 COLL VENOUS BLD VENIPUNCTURE: CPT

## 2025-06-17 PROCEDURE — 99214 OFFICE O/P EST MOD 30 MIN: CPT

## 2025-06-18 LAB
BASOPHILS # BLD AUTO: 0.02 K/UL
BASOPHILS NFR BLD AUTO: 0.4 %
EOSINOPHIL # BLD AUTO: 0.13 K/UL
EOSINOPHIL NFR BLD AUTO: 2.4 %
HCT VFR BLD CALC: 31.4 %
HGB BLD-MCNC: 9.7 G/DL
IMM GRANULOCYTES NFR BLD AUTO: 0.6 %
LYMPHOCYTES # BLD AUTO: 1.45 K/UL
LYMPHOCYTES NFR BLD AUTO: 27 %
MAN DIFF?: NORMAL
MCHC RBC-ENTMCNC: 30.9 G/DL
MCHC RBC-ENTMCNC: 32 PG
MCV RBC AUTO: 103.6 FL
MONOCYTES # BLD AUTO: 0.63 K/UL
MONOCYTES NFR BLD AUTO: 11.7 %
NEUTROPHILS # BLD AUTO: 3.12 K/UL
NEUTROPHILS NFR BLD AUTO: 57.9 %
PLATELET # BLD AUTO: 133 K/UL
RBC # BLD: 3.03 M/UL
RBC # FLD: 12.8 %
WBC # FLD AUTO: 5.38 K/UL

## 2025-06-23 ENCOUNTER — APPOINTMENT (OUTPATIENT)
Dept: CARDIOLOGY | Facility: CLINIC | Age: 65
End: 2025-06-23

## 2025-06-25 ENCOUNTER — APPOINTMENT (OUTPATIENT)
Dept: CARDIOLOGY | Facility: CLINIC | Age: 65
End: 2025-06-25
Payer: MEDICARE

## 2025-06-25 VITALS
DIASTOLIC BLOOD PRESSURE: 80 MMHG | WEIGHT: 197 LBS | SYSTOLIC BLOOD PRESSURE: 130 MMHG | OXYGEN SATURATION: 96 % | BODY MASS INDEX: 27.48 KG/M2 | HEART RATE: 92 BPM

## 2025-06-25 PROCEDURE — 99214 OFFICE O/P EST MOD 30 MIN: CPT

## 2025-06-25 PROCEDURE — G2211 COMPLEX E/M VISIT ADD ON: CPT

## 2025-07-07 ENCOUNTER — OUTPATIENT (OUTPATIENT)
Dept: OUTPATIENT SERVICES | Facility: HOSPITAL | Age: 65
LOS: 1 days | End: 2025-07-07
Payer: MEDICARE

## 2025-07-07 ENCOUNTER — APPOINTMENT (OUTPATIENT)
Dept: ULTRASOUND IMAGING | Facility: IMAGING CENTER | Age: 65
End: 2025-07-07
Payer: MEDICARE

## 2025-07-07 DIAGNOSIS — Z00.8 ENCOUNTER FOR OTHER GENERAL EXAMINATION: ICD-10-CM

## 2025-07-07 DIAGNOSIS — Z78.9 OTHER SPECIFIED HEALTH STATUS: Chronic | ICD-10-CM

## 2025-07-07 DIAGNOSIS — I10 ESSENTIAL (PRIMARY) HYPERTENSION: ICD-10-CM

## 2025-07-07 PROCEDURE — 93970 EXTREMITY STUDY: CPT | Mod: 26

## 2025-07-07 PROCEDURE — 93970 EXTREMITY STUDY: CPT

## 2025-07-18 ENCOUNTER — APPOINTMENT (OUTPATIENT)
Dept: INTERNAL MEDICINE | Facility: CLINIC | Age: 65
End: 2025-07-18

## 2025-07-31 ENCOUNTER — APPOINTMENT (OUTPATIENT)
Dept: INTERNAL MEDICINE | Facility: CLINIC | Age: 65
End: 2025-07-31

## 2025-08-04 ENCOUNTER — APPOINTMENT (OUTPATIENT)
Dept: CARDIOLOGY | Facility: CLINIC | Age: 65
End: 2025-08-04